# Patient Record
Sex: FEMALE | Race: WHITE | Employment: FULL TIME | URBAN - NONMETROPOLITAN AREA
[De-identification: names, ages, dates, MRNs, and addresses within clinical notes are randomized per-mention and may not be internally consistent; named-entity substitution may affect disease eponyms.]

---

## 2018-02-07 ENCOUNTER — HOSPITAL ENCOUNTER (OUTPATIENT)
Dept: WOMENS IMAGING | Age: 53
Discharge: HOME | End: 2018-02-09
Payer: COMMERCIAL

## 2018-02-07 DIAGNOSIS — Z12.39 BREAST SCREENING: ICD-10-CM

## 2018-02-07 PROCEDURE — 77067 SCR MAMMO BI INCL CAD: CPT

## 2018-08-15 DIAGNOSIS — N39.0 URINARY TRACT INFECTION WITHOUT HEMATURIA, SITE UNSPECIFIED: Primary | ICD-10-CM

## 2018-08-15 DIAGNOSIS — F32.A DEPRESSION, UNSPECIFIED DEPRESSION TYPE: Primary | ICD-10-CM

## 2018-08-15 RX ORDER — NITROFURANTOIN MACROCRYSTALS 50 MG/1
50 CAPSULE ORAL DAILY
Qty: 30 CAPSULE | Refills: 12 | Status: SHIPPED | OUTPATIENT
Start: 2018-08-15 | End: 2018-09-14

## 2018-10-31 ENCOUNTER — HOSPITAL ENCOUNTER (OUTPATIENT)
Age: 53
Discharge: HOME | End: 2018-10-31
Payer: COMMERCIAL

## 2018-10-31 ENCOUNTER — TELEPHONE (OUTPATIENT)
Dept: OBGYN | Age: 53
End: 2018-10-31

## 2018-10-31 DIAGNOSIS — R30.0 DYSURIA: Primary | ICD-10-CM

## 2018-10-31 DIAGNOSIS — R30.0 DYSURIA: ICD-10-CM

## 2018-10-31 LAB
BILIRUBIN URINE: NEGATIVE
COLOR: YELLOW
COMMENT UA: NORMAL
GLUCOSE URINE: NEGATIVE
KETONES, URINE: NEGATIVE
LEUKOCYTE ESTERASE, URINE: NEGATIVE
NITRITE, URINE: NEGATIVE
PH UA: 6 (ref 5–9)
PROTEIN UA: NEGATIVE
SPECIFIC GRAVITY UA: 1.01 (ref 1.01–1.02)
TURBIDITY: CLEAR
URINE HGB: NEGATIVE
UROBILINOGEN, URINE: NORMAL

## 2018-10-31 PROCEDURE — 81003 URINALYSIS AUTO W/O SCOPE: CPT

## 2018-10-31 PROCEDURE — 87086 URINE CULTURE/COLONY COUNT: CPT

## 2018-10-31 RX ORDER — NITROFURANTOIN 25; 75 MG/1; MG/1
100 CAPSULE ORAL 2 TIMES DAILY
Qty: 14 CAPSULE | Refills: 0 | Status: SHIPPED | OUTPATIENT
Start: 2018-10-31 | End: 2018-11-06

## 2018-11-01 LAB
CULTURE: NORMAL
Lab: NORMAL
SPECIMEN DESCRIPTION: NORMAL
STATUS: NORMAL

## 2018-11-06 ENCOUNTER — OFFICE VISIT (OUTPATIENT)
Dept: OBGYN | Age: 53
End: 2018-11-06
Payer: COMMERCIAL

## 2018-11-06 ENCOUNTER — HOSPITAL ENCOUNTER (OUTPATIENT)
Age: 53
Setting detail: SPECIMEN
Discharge: HOME | End: 2018-11-06
Payer: COMMERCIAL

## 2018-11-06 VITALS
WEIGHT: 166.8 LBS | BODY MASS INDEX: 32.75 KG/M2 | DIASTOLIC BLOOD PRESSURE: 80 MMHG | SYSTOLIC BLOOD PRESSURE: 138 MMHG | HEIGHT: 60 IN

## 2018-11-06 DIAGNOSIS — Z00.00 WELLNESS EXAMINATION: Primary | ICD-10-CM

## 2018-11-06 DIAGNOSIS — Z01.419 ENCOUNTER FOR ANNUAL ROUTINE GYNECOLOGICAL EXAMINATION: ICD-10-CM

## 2018-11-06 DIAGNOSIS — F43.23 ADJUSTMENT DISORDER WITH MIXED ANXIETY AND DEPRESSED MOOD: ICD-10-CM

## 2018-11-06 DIAGNOSIS — L91.8 SKIN TAG: ICD-10-CM

## 2018-11-06 DIAGNOSIS — N30.90 CYSTITIS: ICD-10-CM

## 2018-11-06 LAB — HGB, POC: 14.3

## 2018-11-06 PROCEDURE — 99396 PREV VISIT EST AGE 40-64: CPT | Performed by: ADVANCED PRACTICE MIDWIFE

## 2018-11-06 PROCEDURE — 88304 TISSUE EXAM BY PATHOLOGIST: CPT

## 2018-11-06 RX ORDER — NITROFURANTOIN MACROCRYSTALS 100 MG/1
100 CAPSULE ORAL 4 TIMES DAILY
COMMUNITY
End: 2020-02-13

## 2018-11-06 RX ORDER — MONTELUKAST SODIUM 10 MG/1
10 TABLET ORAL NIGHTLY
COMMUNITY

## 2018-11-06 RX ORDER — PROPRANOLOL HYDROCHLORIDE 20 MG/1
20 TABLET ORAL 3 TIMES DAILY
COMMUNITY

## 2018-11-06 RX ORDER — FUROSEMIDE 20 MG/1
20 TABLET ORAL 2 TIMES DAILY
COMMUNITY

## 2018-11-06 RX ORDER — LORATADINE 10 MG/1
10 TABLET ORAL DAILY
COMMUNITY

## 2018-11-06 ASSESSMENT — ENCOUNTER SYMPTOMS
SHORTNESS OF BREATH: 0
COUGH: 0
SINUS PAIN: 0
CONSTIPATION: 0
DIARRHEA: 0
DOUBLE VISION: 0
SORE THROAT: 0
WHEEZING: 0
VOMITING: 0
ABDOMINAL PAIN: 0

## 2018-11-06 ASSESSMENT — PATIENT HEALTH QUESTIONNAIRE - PHQ9
SUM OF ALL RESPONSES TO PHQ QUESTIONS 1-9: 0
SUM OF ALL RESPONSES TO PHQ QUESTIONS 1-9: 0
2. FEELING DOWN, DEPRESSED OR HOPELESS: 0
1. LITTLE INTEREST OR PLEASURE IN DOING THINGS: 0
SUM OF ALL RESPONSES TO PHQ9 QUESTIONS 1 & 2: 0

## 2018-11-07 RX ORDER — NITROFURANTOIN MACROCRYSTALS 50 MG/1
50 CAPSULE ORAL DAILY
Qty: 30 CAPSULE | Refills: 11 | Status: SHIPPED | OUTPATIENT
Start: 2018-11-07 | End: 2018-11-17

## 2018-11-08 LAB — DERMATOLOGY PATHOLOGY REPORT: NORMAL

## 2018-11-09 ENCOUNTER — TELEPHONE (OUTPATIENT)
Dept: OBGYN | Age: 53
End: 2018-11-09

## 2018-11-09 ENCOUNTER — OFFICE VISIT (OUTPATIENT)
Dept: OBGYN | Age: 53
End: 2018-11-09
Payer: COMMERCIAL

## 2018-11-09 ENCOUNTER — HOSPITAL ENCOUNTER (OUTPATIENT)
Age: 53
Setting detail: SPECIMEN
Discharge: HOME | End: 2018-11-09
Payer: COMMERCIAL

## 2018-11-09 VITALS
HEIGHT: 60 IN | SYSTOLIC BLOOD PRESSURE: 124 MMHG | DIASTOLIC BLOOD PRESSURE: 78 MMHG | WEIGHT: 165 LBS | BODY MASS INDEX: 32.39 KG/M2

## 2018-11-09 DIAGNOSIS — L08.9 WOUND INFECTION: ICD-10-CM

## 2018-11-09 DIAGNOSIS — L08.9 WOUND INFECTION: Primary | ICD-10-CM

## 2018-11-09 DIAGNOSIS — T14.8XXA WOUND INFECTION: Primary | ICD-10-CM

## 2018-11-09 DIAGNOSIS — T14.8XXA WOUND INFECTION: ICD-10-CM

## 2018-11-09 PROCEDURE — 99213 OFFICE O/P EST LOW 20 MIN: CPT | Performed by: ADVANCED PRACTICE MIDWIFE

## 2018-11-09 PROCEDURE — 87205 SMEAR GRAM STAIN: CPT

## 2018-11-09 PROCEDURE — 87070 CULTURE OTHR SPECIMN AEROBIC: CPT

## 2018-11-09 RX ORDER — CEPHALEXIN 500 MG/1
500 CAPSULE ORAL 3 TIMES DAILY
Qty: 21 CAPSULE | Refills: 0 | Status: SHIPPED | OUTPATIENT
Start: 2018-11-09 | End: 2020-02-13

## 2018-11-09 NOTE — PROGRESS NOTES
pressure 124/78, height 5' (1.524 m), weight 165 lb (74.8 kg), not currently breastfeeding. Labs:    No results found for this visit on 11/09/18. NURSE: Lianna MCALLISTER    HPI: here for f/u to left axillary skin tag removal, is concerned about infection because \"looks yellow\" denies pain, denies fevers     PT denies fever, chills, nausea and vomiting       Objective  Lymphatic:   no lymphadenopathy  Right axillary excision site, bandaid removed, no redness or drainage, likely yellowing is granulation tissue  Assessment and Plan          Diagnosis Orders   1. Wound infection  Wound Culture    cephALEXin (KEFLEX) 500 MG capsule     Will cover prophylacticALLY with antibiotics and await culture, care instructions give to leave open, ok to use neosporin        I am having Ms. Hutchinson start on cephALEXin. I am also having her maintain her sertraline, propranolol, furosemide, loratadine, montelukast, nitrofurantoin, sertraline, and nitrofurantoin. Return if symptoms worsen or fail to improve, for will call with results. There are no Patient Instructions on file for this visit. Over 50% of time spent on counseling and care coordination on: see assessment and plan,  She was also counseled on her preventative health maintenance recommendations and follow-up.         FF time: 15 min      Aron Yancey,11/10/2018 11:53 AM

## 2018-11-09 NOTE — LETTER
[facility]  [address] [place] [address]  Phone: [phone]  Fax: VEE Horan CNM        November 9, 2018     Patient: Rena Franklin   YOB: 1965   Date of Visit: 11/9/2018       To Whom it May Concern:    Kamra Baeza was seen in my clinic on 11/9/2018, please excuse her from work. If you have any questions or concerns, please don't hesitate to call.     Sincerely,         VEE Pavon CNM

## 2018-11-11 LAB
CULTURE: NO GROWTH
DIRECT EXAM: NORMAL
DIRECT EXAM: NORMAL
Lab: NORMAL
SPECIMEN DESCRIPTION: NORMAL
STATUS: NORMAL

## 2018-11-13 ENCOUNTER — TELEPHONE (OUTPATIENT)
Dept: OBGYN | Age: 53
End: 2018-11-13

## 2019-08-27 ENCOUNTER — TELEPHONE (OUTPATIENT)
Dept: OBGYN | Age: 54
End: 2019-08-27

## 2019-08-27 DIAGNOSIS — N39.0 RECURRENT UTI: Primary | ICD-10-CM

## 2019-08-27 RX ORDER — NITROFURANTOIN MACROCRYSTALS 50 MG/1
50 CAPSULE ORAL DAILY
Qty: 30 CAPSULE | Refills: 6 | Status: SHIPPED | OUTPATIENT
Start: 2019-08-27 | End: 2021-03-09 | Stop reason: SDUPTHER

## 2019-12-17 DIAGNOSIS — F32.A DEPRESSION, UNSPECIFIED DEPRESSION TYPE: ICD-10-CM

## 2020-02-13 ENCOUNTER — OFFICE VISIT (OUTPATIENT)
Dept: OBGYN | Age: 55
End: 2020-02-13
Payer: COMMERCIAL

## 2020-02-13 ENCOUNTER — HOSPITAL ENCOUNTER (OUTPATIENT)
Age: 55
Setting detail: SPECIMEN
Discharge: HOME | End: 2020-02-13
Payer: COMMERCIAL

## 2020-02-13 VITALS
SYSTOLIC BLOOD PRESSURE: 130 MMHG | BODY MASS INDEX: 32.98 KG/M2 | DIASTOLIC BLOOD PRESSURE: 74 MMHG | WEIGHT: 168 LBS | HEIGHT: 60 IN

## 2020-02-13 PROCEDURE — G0145 SCR C/V CYTO,THINLAYER,RESCR: HCPCS

## 2020-02-13 PROCEDURE — 99396 PREV VISIT EST AGE 40-64: CPT | Performed by: ADVANCED PRACTICE MIDWIFE

## 2020-02-13 RX ORDER — FLUTICASONE PROPIONATE 50 MCG
SPRAY, SUSPENSION (ML) NASAL
COMMUNITY
Start: 2020-02-03

## 2020-02-13 ASSESSMENT — ENCOUNTER SYMPTOMS
ABDOMINAL PAIN: 0
SHORTNESS OF BREATH: 0
BACK PAIN: 0

## 2020-02-13 ASSESSMENT — PATIENT HEALTH QUESTIONNAIRE - PHQ9
SUM OF ALL RESPONSES TO PHQ9 QUESTIONS 1 & 2: 0
2. FEELING DOWN, DEPRESSED OR HOPELESS: 0
SUM OF ALL RESPONSES TO PHQ QUESTIONS 1-9: 0
1. LITTLE INTEREST OR PLEASURE IN DOING THINGS: 0
SUM OF ALL RESPONSES TO PHQ QUESTIONS 1-9: 0

## 2020-02-13 NOTE — PROGRESS NOTES
YEARLY PHYSICAL    Date of service: 2020    Fredy Joens  Is a 49 y.o.   female    PT's PCP is: Boby Riley MD     : 1965                                             Subjective:       No LMP recorded. Patient has had a hysterectomy. Are your menses regular: not applicable    OB History    Para Term  AB Living   2 2 2     2   SAB TAB Ectopic Molar Multiple Live Births             2      # Outcome Date GA Lbr Leo/2nd Weight Sex Delivery Anes PTL Lv   2 Term 89 40w0d   M Vag-Spont   SAVANNA   1 Term 86 40w0d   F Vag-Spont   SAVANNA        Social History     Tobacco Use   Smoking Status Never Smoker   Smokeless Tobacco Never Used        Social History     Substance and Sexual Activity   Alcohol Use No       Family History   Problem Relation Age of Onset    Other Other         No family h/o ovarian or breast cancer. No family h/o DVT. Any family history of breast or ovarian cancer: No    Any family history of blood clots: No      Allergies: Patient has no known allergies.       Current Outpatient Medications:     fluticasone (FLONASE) 50 MCG/ACT nasal spray, , Disp: , Rfl:     nitrofurantoin (MACRODANTIN) 50 MG capsule, Take 1 capsule by mouth daily, Disp: 30 capsule, Rfl: 6    sertraline (ZOLOFT) 50 MG tablet, Take 1 tablet by mouth daily, Disp: 30 tablet, Rfl: 11    propranolol (INDERAL) 20 MG tablet, Take 20 mg by mouth 3 times daily, Disp: , Rfl:     furosemide (LASIX) 20 MG tablet, Take 20 mg by mouth 2 times daily, Disp: , Rfl:     loratadine (CLARITIN) 10 MG tablet, Take 10 mg by mouth daily, Disp: , Rfl:     montelukast (SINGULAIR) 10 MG tablet, Take 10 mg by mouth nightly, Disp: , Rfl:     Social History     Substance and Sexual Activity   Sexual Activity Yes    Partners: Male       Any bleeding or pain with intercourse: No    Last Yearly:  2019    Last pap:     Last HPV: Prolapse absent  Urethra: Fullness absent, Masses absent  Bladder:  Fullness absent, Masses absent, Tenderness absent, Cystocele absent  Vagina:  General appearance normal, Estrogen effect normal, Discharge absent, Lesions absent, Pelvic support normal  [place]: Masses absent, Tenderness absent  Anus/Perineum:  Lesions absent and Masses absent                                                            Assessment and Plan          Diagnosis Orders   1. Encounter for annual routine gynecological examination  PAP SMEAR   2. Screening mammogram, encounter for  [facility] DIGITAL SCREEN W OR WO CAD BILATERAL             I am having [facility] maintain her propranolol, furosemide, loratadine, montelukast, sertraline, nitrofurantoin, and fluticasone. Return in about 1 year (around 2/13/2021) for yearly. She was also counseled on her preventative health maintenance recommendations and follow-up. There are no Patient Instructions on file for this visit.     Maisha Yancey,2/13/2020 9:21 AM

## 2020-02-24 LAB — CYTOLOGY REPORT: NORMAL

## 2020-06-24 ENCOUNTER — HOSPITAL ENCOUNTER (OUTPATIENT)
Dept: WOMENS IMAGING | Age: 55
Discharge: HOME | End: 2020-06-26
Payer: COMMERCIAL

## 2020-06-24 PROCEDURE — 77063 BREAST TOMOSYNTHESIS BI: CPT

## 2020-09-03 RX ORDER — NITROFURANTOIN MACROCRYSTALS 50 MG/1
50 CAPSULE ORAL DAILY
Qty: 30 CAPSULE | Refills: 5 | Status: SHIPPED | OUTPATIENT
Start: 2020-09-03 | End: 2020-10-03

## 2021-03-09 DIAGNOSIS — N39.0 RECURRENT UTI: ICD-10-CM

## 2021-03-09 RX ORDER — NITROFURANTOIN MACROCRYSTALS 50 MG/1
50 CAPSULE ORAL DAILY
Qty: 30 CAPSULE | Refills: 6 | Status: SHIPPED | OUTPATIENT
Start: 2021-03-09 | End: 2021-10-25

## 2021-03-09 NOTE — TELEPHONE ENCOUNTER
Patient had appointment on 2/16/21 Level three day. Rescheduled for 6/15/21. Needs refill of Macrodantin sent to [facility].

## 2021-04-26 DIAGNOSIS — F43.23 ADJUSTMENT DISORDER WITH MIXED ANXIETY AND DEPRESSED MOOD: ICD-10-CM

## 2021-06-15 ENCOUNTER — OFFICE VISIT (OUTPATIENT)
Dept: OBGYN | Age: 56
End: 2021-06-15
Payer: COMMERCIAL

## 2021-06-15 VITALS
SYSTOLIC BLOOD PRESSURE: 130 MMHG | BODY MASS INDEX: 32 KG/M2 | DIASTOLIC BLOOD PRESSURE: 74 MMHG | WEIGHT: 163 LBS | HEIGHT: 60 IN

## 2021-06-15 DIAGNOSIS — Z01.419 ENCOUNTER FOR ANNUAL ROUTINE GYNECOLOGICAL EXAMINATION: Primary | ICD-10-CM

## 2021-06-15 DIAGNOSIS — F43.23 ADJUSTMENT DISORDER WITH MIXED ANXIETY AND DEPRESSED MOOD: ICD-10-CM

## 2021-06-15 DIAGNOSIS — Z12.31 SCREENING MAMMOGRAM, ENCOUNTER FOR: ICD-10-CM

## 2021-06-15 DIAGNOSIS — Z12.11 SCREEN FOR COLON CANCER: ICD-10-CM

## 2021-06-15 PROCEDURE — 99396 PREV VISIT EST AGE 40-64: CPT | Performed by: ADVANCED PRACTICE MIDWIFE

## 2021-06-15 RX ORDER — DICYCLOMINE HYDROCHLORIDE 10 MG/1
CAPSULE ORAL
COMMUNITY
Start: 2021-05-19

## 2021-06-15 RX ORDER — ALBUTEROL SULFATE 90 UG/1
AEROSOL, METERED RESPIRATORY (INHALATION)
COMMUNITY
Start: 2021-05-19

## 2021-06-15 ASSESSMENT — PATIENT HEALTH QUESTIONNAIRE - PHQ9
1. LITTLE INTEREST OR PLEASURE IN DOING THINGS: 0
SUM OF ALL RESPONSES TO PHQ QUESTIONS 1-9: 0
SUM OF ALL RESPONSES TO PHQ9 QUESTIONS 1 & 2: 0
2. FEELING DOWN, DEPRESSED OR HOPELESS: 0

## 2021-06-15 ASSESSMENT — ENCOUNTER SYMPTOMS
BACK PAIN: 0
SHORTNESS OF BREATH: 0
ABDOMINAL PAIN: 0

## 2021-06-15 NOTE — PROGRESS NOTES
YEARLY PHYSICAL    Date of service: 6/15/2021    Chester Kumar  Is a 48 y.o.   female    PT's PCP is: Lara Clarke MD     : 1965                                             Subjective:       No LMP recorded. Patient has had a hysterectomy. Are your menses regular: not applicable    OB History    Para Term  AB Living   2 2 2     2   SAB TAB Ectopic Molar Multiple Live Births             2      # Outcome Date GA Lbr Leo/2nd Weight Sex Delivery Anes PTL Lv   2 Term 89 40w0d   M Vag-Spont   SAVANNA   1 Term 86 40w0d   F Vag-Spont   SAVANNA        Social History     Tobacco Use   Smoking Status Never Smoker   Smokeless Tobacco Never Used        Social History     Substance and Sexual Activity   Alcohol Use No       Family History   Problem Relation Age of Onset    Other Other         No family h/o ovarian or breast cancer. No family h/o DVT. Any family history of breast or ovarian cancer: No    Any family history of blood clots: No    Have you had a positive covid test: No    Have you had the covid immunization: Yes      Allergies: Patient has no known allergies.       Current Outpatient Medications:     albuterol sulfate  (90 Base) MCG/ACT inhaler, inhale 2 puffs by mouth and INTO THE LUNGS every 4 hours if needed, Disp: , Rfl:     dicyclomine (BENTYL) 10 MG capsule, , Disp: , Rfl:     NONFORMULARY, Indications: Control , Disp: , Rfl:     nitrofurantoin (MACRODANTIN) 50 MG capsule, Take 1 capsule by mouth daily, Disp: 30 capsule, Rfl: 6    fluticasone (FLONASE) 50 MCG/ACT nasal spray, , Disp: , Rfl:     propranolol (INDERAL) 20 MG tablet, Take 20 mg by mouth 3 times daily, Disp: , Rfl:     furosemide (LASIX) 20 MG tablet, Take 20 mg by mouth 2 times daily, Disp: , Rfl:     loratadine (CLARITIN) 10 MG tablet, Take 10 mg by mouth daily, Disp: , Rfl:     montelukast (SINGULAIR) 10 MG tablet, Take 10 mg by mouth nightly, Disp: , Rfl:     sertraline (ZOLOFT) 50 MG tablet, Take 1 tablet by mouth once daily, Disp: 90 tablet, Rfl: 3    Social History     Substance and Sexual Activity   Sexual Activity Yes    Partners: Male       Any bleeding or pain with intercourse: No    Last Yearly:  2020    Last pap: 2020    Last HPV: unknown    Last Mammogram: 06/24/2020    Last Delsie Stammer never    Last colorectal screen- type:colonoscopy*  date  2000    Do you do self breast exams: Yes    Past Medical History:   Diagnosis Date    Hypertension        Past Surgical History:   Procedure Laterality Date    APPENDECTOMY      CHOLECYSTECTOMY      HYSTERECTOMY, VAGINAL      KNEE SURGERY      x 4    OVARY REMOVAL      SHOULDER SURGERY Left        Family History   Problem Relation Age of Onset    Other Other         No family h/o ovarian or breast cancer. No family h/o DVT. Chief Complaint   Patient presents with    Gynecologic Exam     Yearly exam. Last pap 02/13/2020 negative. PE:  Vital Signs  Blood pressure 130/74, height 5' (1.524 m), weight 163 lb (73.9 kg), not currently breastfeeding. Estimated body mass index is 31.83 kg/m² as calculated from the following:    Height as of this encounter: 5' (1.524 m). Weight as of this encounter: 163 lb (73.9 kg). Labs:    No results found for this visit on 06/15/21. PHQ-9 Total Score: 0 (6/15/2021  9:11 AM)      NURSE: Lianna MCALLISTER    HPI: here for annual gyn exam, has started using OTC urinary health product, desires to d/c daily macrodantin prophylactic dosing    Review of Systems   Constitutional: Negative. HENT: Negative for congestion. Respiratory: Negative for shortness of breath. Cardiovascular: Negative for chest pain. Gastrointestinal: Negative for abdominal pain. Genitourinary: Negative for dysuria. Musculoskeletal: Negative for back pain. Skin: Negative for rash. Neurological: Negative for headaches. Psychiatric/Behavioral: The patient is not nervous/anxious. Objective  Lymphatic:   no lymphadenopathy  Heent:   negative   Cor: regular rate and rhythm, no murmurs              Pul:clear to auscultation bilaterally- no wheezes, rales or rhonchi, normal air movement, no respiratory distress      GI: Abdomen soft, non-tender. BS normal. No masses,  No organomegaly           Extremities: normal strength, tone, and muscle mass   Breasts: Breast:normal appearance, no masses or tenderness   Pelvic Exam: GENITAL/URINARY:  External Genitalia:  General appearance; normal, Hair distribution; normal, Lesions absent  Urethral Meatus:  Size normal, Location normal, Lesions absent, Prolapse absent  Urethra: Fullness absent, Masses absent  Bladder:  Fullness absent, Masses absent, Tenderness absent, Cystocele absent  Vagina:  General appearance normal, Estrogen effect normal, Discharge absent, Lesions absent, Pelvic support normal  [place]: Masses absent, Tenderness absent  Anus/Perineum:  Lesions absent and Masses absent                                                              Assessment and Plan          Diagnosis Orders   1. Encounter for annual routine gynecological examination     2. Screening mammogram, encounter for  [facility] SHAI DIGITAL SCREEN BILATERAL   3. Screen for colon cancer  Cologuard (For External Results Only)       is going to stop prophy macrodantin and continue OTC product      I am having Janoctavio Joness maintain her propranolol, furosemide, loratadine, montelukast, fluticasone, nitrofurantoin, albuterol sulfate HFA, dicyclomine, and NONFORMULARY. Return in about 1 year (around 6/15/2022) for yearly. She was also counseled on her preventative health maintenance recommendations and follow-up. There are no Patient Instructions on file for this visit.     VEE Gonzales CNM,6/15/2021 10:47 PM

## 2021-06-28 DIAGNOSIS — Z12.11 SCREEN FOR COLON CANCER: ICD-10-CM

## 2021-07-07 ENCOUNTER — HOSPITAL ENCOUNTER (OUTPATIENT)
Dept: WOMENS IMAGING | Age: 56
Discharge: HOME | End: 2021-07-09
Payer: COMMERCIAL

## 2021-07-07 DIAGNOSIS — Z12.31 SCREENING MAMMOGRAM, ENCOUNTER FOR: ICD-10-CM

## 2021-07-07 PROCEDURE — 77063 BREAST TOMOSYNTHESIS BI: CPT

## 2021-09-22 ENCOUNTER — TELEPHONE (OUTPATIENT)
Dept: OBGYN | Age: 56
End: 2021-09-22

## 2021-09-22 NOTE — TELEPHONE ENCOUNTER
Patient calls and complains of vulvar irritation for the last 4 days. She states it is painful. Tried desitin not helping. Can anything be given?

## 2021-10-19 ENCOUNTER — TELEPHONE (OUTPATIENT)
Dept: OBGYN | Age: 56
End: 2021-10-19

## 2021-10-19 NOTE — TELEPHONE ENCOUNTER
Patient advised that Cologard was not acceptable and will need re collected. Patient states she will contact company and re collect.

## 2021-10-25 DIAGNOSIS — N39.0 RECURRENT UTI: ICD-10-CM

## 2021-10-25 RX ORDER — NITROFURANTOIN MACROCRYSTALS 50 MG/1
CAPSULE ORAL
Qty: 90 CAPSULE | Refills: 1 | Status: SHIPPED | OUTPATIENT
Start: 2021-10-25

## 2022-03-03 ENCOUNTER — TELEPHONE (OUTPATIENT)
Dept: OBGYN | Age: 57
End: 2022-03-03

## 2022-03-03 RX ORDER — FLUCONAZOLE 150 MG/1
150 TABLET ORAL ONCE
Qty: 1 TABLET | Refills: 0 | Status: SHIPPED | OUTPATIENT
Start: 2022-03-03 | End: 2022-03-03

## 2022-03-03 NOTE — TELEPHONE ENCOUNTER
C/O vaginal redness, sore and vaginal swelling. No discharge however very uncomfortable. Patient has appointment next week however does not feel she can wait until then. Yasmeen Barrett

## 2022-03-03 NOTE — TELEPHONE ENCOUNTER
We can send in a diflucan tablet 150 mg to hold her over, I think she already has lotrisone but that may help too.   Otherwise, she definitely needs seen

## 2022-03-09 ENCOUNTER — OFFICE VISIT (OUTPATIENT)
Dept: OBGYN | Age: 57
End: 2022-03-09
Payer: COMMERCIAL

## 2022-03-09 ENCOUNTER — HOSPITAL ENCOUNTER (OUTPATIENT)
Age: 57
Setting detail: SPECIMEN
Discharge: HOME | End: 2022-03-09
Payer: COMMERCIAL

## 2022-03-09 VITALS
DIASTOLIC BLOOD PRESSURE: 74 MMHG | WEIGHT: 167 LBS | SYSTOLIC BLOOD PRESSURE: 126 MMHG | BODY MASS INDEX: 32.79 KG/M2 | HEIGHT: 60 IN

## 2022-03-09 DIAGNOSIS — F43.23 ADJUSTMENT DISORDER WITH MIXED ANXIETY AND DEPRESSED MOOD: ICD-10-CM

## 2022-03-09 DIAGNOSIS — N76.0 VULVOVAGINITIS: ICD-10-CM

## 2022-03-09 DIAGNOSIS — N39.0 RECURRENT UTI: ICD-10-CM

## 2022-03-09 DIAGNOSIS — N76.0 VULVOVAGINITIS: Primary | ICD-10-CM

## 2022-03-09 DIAGNOSIS — Z12.31 SCREENING MAMMOGRAM, ENCOUNTER FOR: ICD-10-CM

## 2022-03-09 LAB
CANDIDA SPECIES, DNA PROBE: NEGATIVE
GARDNERELLA VAGINALIS, DNA PROBE: NEGATIVE
SOURCE: NORMAL
TRICHOMONAS VAGINALIS DNA: NEGATIVE

## 2022-03-09 PROCEDURE — 87660 TRICHOMONAS VAGIN DIR PROBE: CPT

## 2022-03-09 PROCEDURE — 87480 CANDIDA DNA DIR PROBE: CPT

## 2022-03-09 PROCEDURE — 87510 GARDNER VAG DNA DIR PROBE: CPT

## 2022-03-09 PROCEDURE — 87070 CULTURE OTHR SPECIMN AEROBIC: CPT

## 2022-03-09 PROCEDURE — 99214 OFFICE O/P EST MOD 30 MIN: CPT | Performed by: ADVANCED PRACTICE MIDWIFE

## 2022-03-09 RX ORDER — CLOBETASOL PROPIONATE 0.5 MG/G
OINTMENT TOPICAL
Qty: 45 G | Refills: 2 | Status: SHIPPED | OUTPATIENT
Start: 2022-03-09

## 2022-03-09 RX ORDER — OXYBUTYNIN CHLORIDE 5 MG/1
5 TABLET, EXTENDED RELEASE ORAL DAILY
COMMUNITY
Start: 2022-02-08

## 2022-03-09 RX ORDER — ESTRADIOL 10 UG/1
10 INSERT VAGINAL
Qty: 8 TABLET | Refills: 11 | Status: SHIPPED | OUTPATIENT
Start: 2022-03-10

## 2022-03-09 RX ORDER — NORTRIPTYLINE HYDROCHLORIDE 50 MG/1
50 CAPSULE ORAL NIGHTLY
COMMUNITY

## 2022-03-09 RX ORDER — METHENAMINE, SODIUM PHOSPHATE, MONOBASIC, MONOHYDRATE, PHENYL SALICYLATE, METHYLENE BLUE, AND HYOSCYAMINE SULFATE 120; 40.8; 36; 10; .12 MG/1; MG/1; MG/1; MG/1; MG/1
1 CAPSULE ORAL 2 TIMES DAILY
COMMUNITY
Start: 2022-02-08 | End: 2022-03-10

## 2022-03-09 RX ORDER — NITROFURANTOIN MACROCRYSTALS 50 MG/1
50 CAPSULE ORAL DAILY
Qty: 90 CAPSULE | Refills: 3 | Status: SHIPPED | OUTPATIENT
Start: 2022-03-09 | End: 2022-04-26 | Stop reason: SDUPTHER

## 2022-03-09 NOTE — PROGRESS NOTES
PROBLEM VISIT     Date of service: 3/9/2022    Fritz Montgomery  Is a 55 y.o.  female    PT's PCP is: Lovella Apley, MD     : 1965                                             Subjective:       No LMP recorded. Patient has had a hysterectomy.    OB History    Para Term  AB Living   2 2 2     2   SAB IAB Ectopic Molar Multiple Live Births             2      # Outcome Date GA Lbr Leo/2nd Weight Sex Delivery Anes PTL Lv   2 Term 89 40w0d   M Vag-Spont   SAVANNA   1 Term 86 40w0d   F Vag-Spont   SAVANNA        Social History     Tobacco Use   Smoking Status Never Smoker   Smokeless Tobacco Never Used        Social History     Substance and Sexual Activity   Alcohol Use No       Allergies: Latex      Current Outpatient Medications:     fluticasone-salmeterol (ADVAIR) 250-50 MCG/DOSE AEPB, Inhale 1 puff into the lungs 2 times daily, Disp: , Rfl:     Meth-Hyo-M Bl-Na Phos-Ph Sal (URIBEL) 118 MG CAPS, Take 1 capsule by mouth 2 times daily, Disp: , Rfl:     oxybutynin (DITROPAN-XL) 5 MG extended release tablet, Take 5 mg by mouth daily, Disp: , Rfl:     nortriptyline (PAMELOR) 50 MG capsule, Take 50 mg by mouth nightly, Disp: , Rfl:     nitrofurantoin (MACRODANTIN) 50 MG capsule, Take 1 capsule by mouth once daily, Disp: 90 capsule, Rfl: 1    albuterol sulfate  (90 Base) MCG/ACT inhaler, inhale 2 puffs by mouth and INTO THE LUNGS every 4 hours if needed, Disp: , Rfl:     dicyclomine (BENTYL) 10 MG capsule, , Disp: , Rfl:     sertraline (ZOLOFT) 50 MG tablet, Take 1 tablet by mouth once daily, Disp: 90 tablet, Rfl: 3    fluticasone (FLONASE) 50 MCG/ACT nasal spray, , Disp: , Rfl:     propranolol (INDERAL) 20 MG tablet, Take 20 mg by mouth 3 times daily, Disp: , Rfl:     furosemide (LASIX) 20 MG tablet, Take 20 mg by mouth 2 times daily, Disp: , Rfl:     loratadine (CLARITIN) 10 MG tablet, Take 10 mg by mouth daily, Disp: , Rfl:     montelukast (SINGULAIR) 10 MG tablet, Take 10 mg by mouth nightly, Disp: , Rfl:     NONFORMULARY, Indications: Control  (Patient not taking: No sig reported), Disp: , Rfl:     Social History     Substance and Sexual Activity   Sexual Activity Not Currently    Partners: Male       Last Yearly:  02/13/2020    Last pap: 02/13/2020    Last HPV: unknown    Have you had a positive covid test: No    Have you had the covid immunization: Yes    Chief Complaint   Patient presents with    Vaginal Pain     C/O red warm vaginal pain for approx. 1 week. Patient was given Diflucan and feels somewhat better. Patient has had frequent UTI nd will be seeing urologist in [place] next week. PE:  Vital Signs  Blood pressure 126/74, height 5' (1.524 m), weight 167 lb (75.8 kg), not currently breastfeeding. Estimated body mass index is 32.61 kg/m² as calculated from the following:    Height as of this encounter: 5' (1.524 m). Weight as of this encounter: 167 lb (75.8 kg). No data recorded    NURSE: Lianna MCALLISTER    HPI: here for c/o vulvovaginitis,  but requests annual refills on her zoloft for her anxiety and depression as it is working well - and macrobid to prophylax UTIs (reports her urologists are aware of this and approve of continuation)     PT denies fever, chills, nausea and vomiting       Objective   No acute distress  Excellent communications  Well-nourished       Pelvic Exam: GENITAL/URINARY:  External Genitalia:  General appearance, slight errythema external, Hair distribution; normal, Lesions absent  Urethral Meatus:  Size normal, Location normal, Lesions absent, Prolapse absent  Urethra: Fullness absent, Masses absent  Bladder:  Fullness absent, Masses absent, Tenderness absent, Cystocele absent  Vagina:  General appearance atophic, , Discharge absent, Lesions absent, Pelvic support normal  [place]:   Masses absent, Tenderness absent  Anus/Perineum:  Lesions absent and Masses absent                                                        Results reviewed today:    No results found for this visit on 03/09/22. Assessment and Plan          Diagnosis Orders   1. Vulvovaginitis  Estradiol (VAGIFEM) 10 MCG TABS vaginal tablet    clobetasol (TEMOVATE) 0.05 % ointment   2. Recurrent UTI  nitrofurantoin (MACRODANTIN) 50 MG capsule   3. Screening mammogram, encounter for  [facility] SHAI DIGITAL SCREEN BILATERAL   4. Adjustment disorder with mixed anxiety and depressed mood  sertraline (ZOLOFT) 50 MG tablet       will reculture, treat for atrophy with estrogen and topically with steroids for symptom relief      I am having Bethany Hutchinson start on Estradiol, clobetasol, nitrofurantoin, and sertraline. I am also having her maintain her propranolol, furosemide, loratadine, montelukast, fluticasone, albuterol sulfate HFA, dicyclomine, NONFORMULARY, sertraline, nitrofurantoin, fluticasone-salmeterol, uribel, oxybutynin, and nortriptyline. Return in about 1 year (around 3/9/2023) for yearly. There are no Patient Instructions on file for this visit. Over 50% of time spent on counseling and care coordination on: see assessment and plan,  She was also counseled on her preventative health maintenance recommendations and follow-up.         FF time: 30 min      VEE Garcia CNM,3/9/2022 9:14 AM

## 2022-03-12 LAB
CULTURE: NORMAL
SPECIMEN DESCRIPTION: NORMAL

## 2022-03-14 DIAGNOSIS — B37.31 YEAST VAGINITIS: Primary | ICD-10-CM

## 2022-03-14 RX ORDER — FLUCONAZOLE 150 MG/1
150 TABLET ORAL ONCE
Qty: 1 TABLET | Refills: 0 | Status: SHIPPED | OUTPATIENT
Start: 2022-03-14 | End: 2022-03-14

## 2022-03-23 ENCOUNTER — TELEPHONE (OUTPATIENT)
Dept: OBGYN | Age: 57
End: 2022-03-23

## 2022-03-23 NOTE — TELEPHONE ENCOUNTER
Patient called and left message with concerns. Patient was recently seen and treated with Vagifem and clobetasol. Patient feels she is not better. Still has vaginal irritation and redness no discharge . Advice?

## 2022-03-29 ENCOUNTER — TELEPHONE (OUTPATIENT)
Dept: OBGYN | Age: 57
End: 2022-03-29

## 2022-03-29 NOTE — TELEPHONE ENCOUNTER
Patient calls with concerns, was seen 03/09/2022 and was treated with Vagifem, Diflucan  and Clobetasol. Patient did not improve and was advised to stop the  Vagifem. Patient complains she still has no improvement. Feels very irritated  , warm and moist. Advice ? Appointment ?

## 2022-03-30 ENCOUNTER — HOSPITAL ENCOUNTER (OUTPATIENT)
Age: 57
Setting detail: SPECIMEN
Discharge: HOME | End: 2022-03-30
Payer: COMMERCIAL

## 2022-03-30 ENCOUNTER — PROCEDURE VISIT (OUTPATIENT)
Dept: OBGYN | Age: 57
End: 2022-03-30
Payer: COMMERCIAL

## 2022-03-30 VITALS
WEIGHT: 168.2 LBS | BODY MASS INDEX: 33.02 KG/M2 | HEIGHT: 60 IN | DIASTOLIC BLOOD PRESSURE: 78 MMHG | SYSTOLIC BLOOD PRESSURE: 130 MMHG

## 2022-03-30 DIAGNOSIS — N76.3 CHRONIC VULVITIS: ICD-10-CM

## 2022-03-30 DIAGNOSIS — N76.3 CHRONIC VULVITIS: Primary | ICD-10-CM

## 2022-03-30 PROCEDURE — 56605 BIOPSY OF VULVA/PERINEUM: CPT | Performed by: ADVANCED PRACTICE MIDWIFE

## 2022-03-30 PROCEDURE — 88305 TISSUE EXAM BY PATHOLOGIST: CPT

## 2022-03-30 NOTE — PROGRESS NOTES
PROBLEM VISIT     Date of service: 3/30/2022    Anu Stafford  Is a 55 y.o.  female    PT's PCP is: Anuradha Peck MD     : 1965                                             Subjective:       No LMP recorded. Patient has had a hysterectomy.    OB History    Para Term  AB Living   2 2 2     2   SAB IAB Ectopic Molar Multiple Live Births             2      # Outcome Date GA Lbr Leo/2nd Weight Sex Delivery Anes PTL Lv   2 Term 89 40w0d   M Vag-Spont   SAVANNA   1 Term 86 40w0d   F Vag-Spont   SAVANNA        Social History     Tobacco Use   Smoking Status Never Smoker   Smokeless Tobacco Never Used        Social History     Substance and Sexual Activity   Alcohol Use No       Allergies: Latex      Current Outpatient Medications:     fluticasone-salmeterol (ADVAIR) 250-50 MCG/DOSE AEPB, Inhale 1 puff into the lungs 2 times daily, Disp: , Rfl:     oxybutynin (DITROPAN-XL) 5 MG extended release tablet, Take 5 mg by mouth daily, Disp: , Rfl:     nortriptyline (PAMELOR) 50 MG capsule, Take 50 mg by mouth nightly, Disp: , Rfl:     Estradiol (VAGIFEM) 10 MCG TABS vaginal tablet, Place 1 tablet vaginally Twice a Week, Disp: 8 tablet, Rfl: 11    clobetasol (TEMOVATE) 0.05 % ointment, Apply topically 2 times daily as needed, Disp: 45 g, Rfl: 2    nitrofurantoin (MACRODANTIN) 50 MG capsule, Take 1 capsule by mouth daily, Disp: 90 capsule, Rfl: 3    sertraline (ZOLOFT) 50 MG tablet, Take 1 tablet by mouth daily, Disp: 90 tablet, Rfl: 3    nitrofurantoin (MACRODANTIN) 50 MG capsule, Take 1 capsule by mouth once daily, Disp: 90 capsule, Rfl: 1    albuterol sulfate  (90 Base) MCG/ACT inhaler, inhale 2 puffs by mouth and INTO THE LUNGS every 4 hours if needed, Disp: , Rfl:     dicyclomine (BENTYL) 10 MG capsule, , Disp: , Rfl:     sertraline (ZOLOFT) 50 MG tablet, Take 1 tablet by mouth once daily, Disp: 90 tablet, Rfl: 3    fluticasone (FLONASE) 50 MCG/ACT nasal spray, , Disp: , Rfl:     propranolol (INDERAL) 20 MG tablet, Take 20 mg by mouth 3 times daily, Disp: , Rfl:     furosemide (LASIX) 20 MG tablet, Take 20 mg by mouth 2 times daily, Disp: , Rfl:     loratadine (CLARITIN) 10 MG tablet, Take 10 mg by mouth daily, Disp: , Rfl:     montelukast (SINGULAIR) 10 MG tablet, Take 10 mg by mouth nightly, Disp: , Rfl:     NONFORMULARY, Indications: Control  (Patient not taking: No sig reported), Disp: , Rfl:     Social History     Substance and Sexual Activity   Sexual Activity Not Currently    Partners: Male       Last Yearly:  02/13/2020    Last pap: 02/13/2020    Last HPV: unknown    Have you had a positive covid test: No    Have you had the covid immunization: Yes    Chief Complaint   Patient presents with    Vaginitis     Patient c/o chronic vaginal irritation. PE:  Vital Signs  Blood pressure 130/78, height 5' (1.524 m), weight 168 lb 3.2 oz (76.3 kg), not currently breastfeeding. Estimated body mass index is 32.85 kg/m² as calculated from the following:    Height as of this encounter: 5' (1.524 m). Weight as of this encounter: 168 lb 3.2 oz (76.3 kg). No data recorded    NURSE: Wendy MCALLISTER    HPI: has had persistent vulvar irritation, has had tx for yeast, steroids and topical estrogen     PT denies fever, chills, nausea and vomiting       Objective   No acute distress  Excellent communications  Well-nourished       Pelvic Exam: GENITAL/URINARY:  External Genitalia:  Hair distribution; normal, Lesions absent, l. Minora and introitus blanched with characteristics of atrophy and possibly lichen    Physical Exam  Genitourinary:        sites x2 on l.  Minora at 11 and 1 oclock, cleaned with betadine, injected with 1% lidocaine wheel, biopsy forceps used to take biopsies on both sites, AgNO3 for hemostasis, triple antibiotic ointment covered with gauze                                                             Results reviewed today:    No results found for this visit on 03/30/22. Assessment and Plan          Diagnosis Orders   1. Chronic vulvitis  TN BIOPSY VULVA/PERINEUM,ONE LESN    Surgical Pathology    Surgical Pathology       consider gynazole (see gen culture) diependent on results      I am having Laura Burgos maintain her propranolol, furosemide, loratadine, montelukast, fluticasone, albuterol sulfate HFA, dicyclomine, NONFORMULARY, sertraline, nitrofurantoin, fluticasone-salmeterol, oxybutynin, nortriptyline, Estradiol, clobetasol, nitrofurantoin, and sertraline. No follow-ups on file. There are no Patient Instructions on file for this visit. Over 50% of time spent on counseling and care coordination on: see assessment and plan,  She was also counseled on her preventative health maintenance recommendations and follow-up.         FF time: 30 min      VEE Hernandez CNM,3/30/2022 2:43 PM

## 2022-04-01 LAB — SURGICAL PATHOLOGY REPORT: NORMAL

## 2022-04-06 ENCOUNTER — HOSPITAL ENCOUNTER (OUTPATIENT)
Age: 57
Setting detail: SPECIMEN
Discharge: HOME | End: 2022-04-06
Payer: COMMERCIAL

## 2022-04-06 ENCOUNTER — OFFICE VISIT (OUTPATIENT)
Dept: OBGYN | Age: 57
End: 2022-04-06
Payer: COMMERCIAL

## 2022-04-06 VITALS
BODY MASS INDEX: 32.98 KG/M2 | WEIGHT: 168 LBS | DIASTOLIC BLOOD PRESSURE: 82 MMHG | HEIGHT: 60 IN | SYSTOLIC BLOOD PRESSURE: 122 MMHG

## 2022-04-06 DIAGNOSIS — N90.89 VULVAR IRRITATION: ICD-10-CM

## 2022-04-06 DIAGNOSIS — N90.89 VULVAR IRRITATION: Primary | ICD-10-CM

## 2022-04-06 PROCEDURE — 87070 CULTURE OTHR SPECIMN AEROBIC: CPT

## 2022-04-06 PROCEDURE — 99213 OFFICE O/P EST LOW 20 MIN: CPT | Performed by: ADVANCED PRACTICE MIDWIFE

## 2022-04-06 RX ORDER — SULFAMETHOXAZOLE AND TRIMETHOPRIM 800; 160 MG/1; MG/1
1 TABLET ORAL 2 TIMES DAILY
Qty: 14 TABLET | Refills: 0 | Status: SHIPPED | OUTPATIENT
Start: 2022-04-06 | End: 2022-04-13

## 2022-04-06 RX ORDER — LIDOCAINE 50 MG/G
OINTMENT TOPICAL
Qty: 30 G | Refills: 1 | Status: SHIPPED | OUTPATIENT
Start: 2022-04-06

## 2022-04-06 RX ORDER — FLUCONAZOLE 150 MG/1
150 TABLET ORAL ONCE
Qty: 1 TABLET | Refills: 0 | Status: SHIPPED | OUTPATIENT
Start: 2022-04-06 | End: 2022-04-06

## 2022-04-06 NOTE — PROGRESS NOTES
PROBLEM VISIT     Date of service: 2022    Marty Terrazas  Is a 55 y.o.  female    PT's PCP is: Brady Virk MD     : 1965                                             Subjective:       No LMP recorded. Patient has had a hysterectomy. OB History    Para Term  AB Living   2 2 2     2   SAB IAB Ectopic Molar Multiple Live Births             2      # Outcome Date GA Lbr Leo/2nd Weight Sex Delivery Anes PTL Lv   2 Term 89 40w0d   M Vag-Spont   SAVANNA   1 Term 86 40w0d   F Vag-Spont   SAVANNA        Social History     Tobacco Use   Smoking Status Never Smoker   Smokeless Tobacco Never Used        Social History     Substance and Sexual Activity   Alcohol Use No       Allergies: Latex      Current Outpatient Medications:     sulfamethoxazole-trimethoprim (BACTRIM DS;SEPTRA DS) 800-160 MG per tablet, Take 1 tablet by mouth 2 times daily for 7 days, Disp: 14 tablet, Rfl: 0    fluconazole (DIFLUCAN) 150 MG tablet, Take 1 tablet by mouth once for 1 dose, Disp: 1 tablet, Rfl: 0    lidocaine (XYLOCAINE) 5 % ointment, Apply topically as needed.  Every 2 hours, Disp: 30 g, Rfl: 1    fluticasone-salmeterol (ADVAIR) 250-50 MCG/DOSE AEPB, Inhale 1 puff into the lungs 2 times daily, Disp: , Rfl:     oxybutynin (DITROPAN-XL) 5 MG extended release tablet, Take 5 mg by mouth daily, Disp: , Rfl:     nortriptyline (PAMELOR) 50 MG capsule, Take 50 mg by mouth nightly, Disp: , Rfl:     Estradiol (VAGIFEM) 10 MCG TABS vaginal tablet, Place 1 tablet vaginally Twice a Week, Disp: 8 tablet, Rfl: 11    clobetasol (TEMOVATE) 0.05 % ointment, Apply topically 2 times daily as needed, Disp: 45 g, Rfl: 2    nitrofurantoin (MACRODANTIN) 50 MG capsule, Take 1 capsule by mouth daily, Disp: 90 capsule, Rfl: 3    sertraline (ZOLOFT) 50 MG tablet, Take 1 tablet by mouth daily, Disp: 90 tablet, Rfl: 3    nitrofurantoin (MACRODANTIN) 50 MG capsule, Take 1 capsule by mouth once daily, Disp: 90 capsule, Rfl: 1    albuterol sulfate  (90 Base) MCG/ACT inhaler, inhale 2 puffs by mouth and INTO THE LUNGS every 4 hours if needed, Disp: , Rfl:     dicyclomine (BENTYL) 10 MG capsule, , Disp: , Rfl:     fluticasone (FLONASE) 50 MCG/ACT nasal spray, , Disp: , Rfl:     propranolol (INDERAL) 20 MG tablet, Take 20 mg by mouth 3 times daily, Disp: , Rfl:     furosemide (LASIX) 20 MG tablet, Take 20 mg by mouth 2 times daily, Disp: , Rfl:     loratadine (CLARITIN) 10 MG tablet, Take 10 mg by mouth daily, Disp: , Rfl:     montelukast (SINGULAIR) 10 MG tablet, Take 10 mg by mouth nightly, Disp: , Rfl:     NONFORMULARY, Indications: Control  (Patient not taking: No sig reported), Disp: , Rfl:     sertraline (ZOLOFT) 50 MG tablet, Take 1 tablet by mouth once daily, Disp: 90 tablet, Rfl: 3    Social History     Substance and Sexual Activity   Sexual Activity Not Currently    Partners: Male       Last Yearly:  2/13/20    Last pap: 2/13/20    Last HPV: never    Have you had a positive covid test: No    Have you had the covid immunization: Yes    Chief Complaint   Patient presents with    Vaginal Itching     patient presents today for vaginal irritation f/u, vulver bx on 3/30/22. states area is red and very sore/irritated. PE:  Vital Signs  Blood pressure 122/82, height 5' (1.524 m), weight 168 lb (76.2 kg), not currently breastfeeding. Estimated body mass index is 32.81 kg/m² as calculated from the following:    Height as of this encounter: 5' (1.524 m). Weight as of this encounter: 168 lb (76.2 kg).     No data recorded    NURSE: Diana Doshi    HPI: patient had vulvar biopsies one week ago that showed lichen sclerosus, is \"very irritated\" and steroid cream was not helping     PT denies fever, chills, nausea and vomiting       Objective   No acute distress  Excellent communications  Well-nourished   Pelvic Exam: tenderness appears at biopsy sites, appears as normal granulation healing tissue, cultures obtained, care instructions given                       Results reviewed today:    No results found for this visit on 04/06/22. Assessment and Plan          Diagnosis Orders   1. Vulvar irritation  Culture, Genital    sulfamethoxazole-trimethoprim (BACTRIM DS;SEPTRA DS) 800-160 MG per tablet    fluconazole (DIFLUCAN) 150 MG tablet    lidocaine (XYLOCAINE) 5 % ointment       patient to stop all other topicals, ok to use topical lidocaine, will cover with antibiotic while awaiting cultures although appears as normal healing. prophy diflucan per patient request      I am having Tony Torres start on sulfamethoxazole-trimethoprim, fluconazole, and lidocaine. I am also having her maintain her propranolol, furosemide, loratadine, montelukast, fluticasone, albuterol sulfate HFA, dicyclomine, NONFORMULARY, sertraline, nitrofurantoin, fluticasone-salmeterol, oxybutynin, nortriptyline, Estradiol, clobetasol, nitrofurantoin, and sertraline. No follow-ups on file. There are no Patient Instructions on file for this visit.     Time spent 20 minutes      VEE Riggins CNM,4/6/2022 3:48 PM

## 2022-04-09 LAB
CULTURE: NORMAL
SPECIMEN DESCRIPTION: NORMAL

## 2022-04-12 ENCOUNTER — OFFICE VISIT (OUTPATIENT)
Dept: OBGYN | Age: 57
End: 2022-04-12
Payer: COMMERCIAL

## 2022-04-12 ENCOUNTER — TELEPHONE (OUTPATIENT)
Dept: OBGYN | Age: 57
End: 2022-04-12

## 2022-04-12 VITALS
BODY MASS INDEX: 32.39 KG/M2 | HEIGHT: 60 IN | SYSTOLIC BLOOD PRESSURE: 134 MMHG | WEIGHT: 165 LBS | DIASTOLIC BLOOD PRESSURE: 80 MMHG

## 2022-04-12 DIAGNOSIS — F41.9 ANXIETY: Primary | ICD-10-CM

## 2022-04-12 DIAGNOSIS — N90.89 VULVAR LESION: ICD-10-CM

## 2022-04-12 PROCEDURE — 99213 OFFICE O/P EST LOW 20 MIN: CPT | Performed by: ADVANCED PRACTICE MIDWIFE

## 2022-04-12 RX ORDER — ALPRAZOLAM 0.5 MG/1
0.5 TABLET ORAL 3 TIMES DAILY PRN
Qty: 20 TABLET | Refills: 0 | Status: SHIPPED | OUTPATIENT
Start: 2022-04-12 | End: 2022-05-12

## 2022-04-12 RX ORDER — SULFAMETHOXAZOLE AND TRIMETHOPRIM 800; 160 MG/1; MG/1
1 TABLET ORAL 2 TIMES DAILY
Qty: 14 TABLET | Refills: 0 | Status: SHIPPED | OUTPATIENT
Start: 2022-04-12 | End: 2022-04-19

## 2022-04-12 NOTE — TELEPHONE ENCOUNTER
Can you let patient know she should probably not overlap her pamelor and xanax at night, so only use the xanax sparingly during the day for anxiety

## 2022-04-12 NOTE — PROGRESS NOTES
PROBLEM VISIT     Date of service: 2022    Roney Newton  Is a 55 y.o.  female    PT's PCP is: Efraín Bentley MD     : 1965                                             Subjective:       No LMP recorded. Patient has had a hysterectomy. OB History    Para Term  AB Living   2 2 2     2   SAB IAB Ectopic Molar Multiple Live Births             2      # Outcome Date GA Lbr Leo/2nd Weight Sex Delivery Anes PTL Lv   2 Term 89 40w0d   M Vag-Spont   SAVANNA   1 Term 86 40w0d   F Vag-Spont   SAVANNA        Social History     Tobacco Use   Smoking Status Never Smoker   Smokeless Tobacco Never Used        Social History     Substance and Sexual Activity   Alcohol Use No       Allergies: Latex      Current Outpatient Medications:     ALPRAZolam (XANAX) 0.5 MG tablet, Take 1 tablet by mouth 3 times daily as needed for Sleep or Anxiety for up to 30 days. , Disp: 20 tablet, Rfl: 0    sulfamethoxazole-trimethoprim (BACTRIM DS;SEPTRA DS) 800-160 MG per tablet, Take 1 tablet by mouth 2 times daily for 7 days, Disp: 14 tablet, Rfl: 0    sulfamethoxazole-trimethoprim (BACTRIM DS;SEPTRA DS) 800-160 MG per tablet, Take 1 tablet by mouth 2 times daily for 7 days, Disp: 14 tablet, Rfl: 0    lidocaine (XYLOCAINE) 5 % ointment, Apply topically as needed.  Every 2 hours, Disp: 30 g, Rfl: 1    fluticasone-salmeterol (ADVAIR) 250-50 MCG/DOSE AEPB, Inhale 1 puff into the lungs 2 times daily, Disp: , Rfl:     oxybutynin (DITROPAN-XL) 5 MG extended release tablet, Take 5 mg by mouth daily, Disp: , Rfl:     nortriptyline (PAMELOR) 50 MG capsule, Take 50 mg by mouth nightly, Disp: , Rfl:     Estradiol (VAGIFEM) 10 MCG TABS vaginal tablet, Place 1 tablet vaginally Twice a Week, Disp: 8 tablet, Rfl: 11    clobetasol (TEMOVATE) 0.05 % ointment, Apply topically 2 times daily as needed, Disp: 45 g, Rfl: 2    nitrofurantoin (MACRODANTIN) 50 MG capsule, Take 1 capsule by mouth daily, Disp: 90 capsule, Rfl: 3    sertraline (ZOLOFT) 50 MG tablet, Take 1 tablet by mouth daily, Disp: 90 tablet, Rfl: 3    nitrofurantoin (MACRODANTIN) 50 MG capsule, Take 1 capsule by mouth once daily, Disp: 90 capsule, Rfl: 1    albuterol sulfate  (90 Base) MCG/ACT inhaler, inhale 2 puffs by mouth and INTO THE LUNGS every 4 hours if needed, Disp: , Rfl:     dicyclomine (BENTYL) 10 MG capsule, , Disp: , Rfl:     NONFORMULARY, Indications: Control , Disp: , Rfl:     sertraline (ZOLOFT) 50 MG tablet, Take 1 tablet by mouth once daily, Disp: 90 tablet, Rfl: 3    fluticasone (FLONASE) 50 MCG/ACT nasal spray, , Disp: , Rfl:     propranolol (INDERAL) 20 MG tablet, Take 20 mg by mouth 3 times daily, Disp: , Rfl:     furosemide (LASIX) 20 MG tablet, Take 20 mg by mouth 2 times daily, Disp: , Rfl:     loratadine (CLARITIN) 10 MG tablet, Take 10 mg by mouth daily, Disp: , Rfl:     montelukast (SINGULAIR) 10 MG tablet, Take 10 mg by mouth nightly, Disp: , Rfl:     Social History     Substance and Sexual Activity   Sexual Activity Not Currently    Partners: Male       Last Yearly:  02/13/2020    Last pap: 02/2020    Last HPV: unknown    Have you had a positive covid test: No    Have you had the covid immunization: Yes    Chief Complaint   Patient presents with    Vaginitis     Recheck vaginal irritation. PE:  Vital Signs  Blood pressure 134/80, height 5' (1.524 m), weight 165 lb (74.8 kg), not currently breastfeeding. Estimated body mass index is 32.22 kg/m² as calculated from the following:    Height as of this encounter: 5' (1.524 m). Weight as of this encounter: 165 lb (74.8 kg). No data recorded    NURSE: Lianna MCALLISTER    HPI:patient here to recheck biopsy sites, appeared as normal granulation tissue and culture was negative;  patient feels bactroban helped and the bactrim more than anything else. Vulvar irritation is causing her anxiety though.      PT denies fever, chills, nausea and vomiting Objective   No acute distress  Excellent communications  Well-nourished      Pelvic Exam: GENITAL/URINARY:  External Genitalia:  General appearance; normal, Hair distribution; normal, Lesions absent, sites now almost completely healed                                                         Results reviewed today:    No results found for this visit on 04/12/22. Assessment and Plan          Diagnosis Orders   1. Anxiety  ALPRAZolam (XANAX) 0.5 MG tablet   2. Vulvar lesion  sulfamethoxazole-trimethoprim (BACTRIM DS;SEPTRA DS) 800-160 MG per tablet       will continue 2nd week of antibiotics and after a month return to steroids for tx of lichen sclerosus      I am having Bethany Hutchinson start on ALPRAZolam and sulfamethoxazole-trimethoprim. I am also having her maintain her propranolol, furosemide, loratadine, montelukast, fluticasone, albuterol sulfate HFA, dicyclomine, NONFORMULARY, sertraline, nitrofurantoin, fluticasone-salmeterol, oxybutynin, nortriptyline, Estradiol, clobetasol, nitrofurantoin, sertraline, sulfamethoxazole-trimethoprim, and lidocaine. Return in about 2 months (around 6/12/2022) for vulvar recheck. There are no Patient Instructions on file for this visit.     Time spent 20 minutes      Debbie Phillip 44 9:46 PM

## 2022-04-13 ENCOUNTER — TELEPHONE (OUTPATIENT)
Dept: OBGYN | Age: 57
End: 2022-04-13

## 2022-04-26 DIAGNOSIS — N39.0 RECURRENT UTI: ICD-10-CM

## 2022-04-26 RX ORDER — NITROFURANTOIN MACROCRYSTALS 50 MG/1
50 CAPSULE ORAL DAILY
Qty: 90 CAPSULE | Refills: 3 | Status: SHIPPED | OUTPATIENT
Start: 2022-04-26

## 2022-06-14 ENCOUNTER — OFFICE VISIT (OUTPATIENT)
Dept: OBGYN | Age: 57
End: 2022-06-14
Payer: COMMERCIAL

## 2022-06-14 VITALS — WEIGHT: 164 LBS | DIASTOLIC BLOOD PRESSURE: 74 MMHG | SYSTOLIC BLOOD PRESSURE: 122 MMHG | BODY MASS INDEX: 32.03 KG/M2

## 2022-06-14 DIAGNOSIS — L90.0 LICHEN SCLEROSUS: Primary | ICD-10-CM

## 2022-06-14 PROCEDURE — 99213 OFFICE O/P EST LOW 20 MIN: CPT | Performed by: ADVANCED PRACTICE MIDWIFE

## 2022-06-14 NOTE — PROGRESS NOTES
PROBLEM VISIT     Date of service: 2022    Mare Garza  Is a 55 y.o.  female    PT's PCP is: Michelle Barlow MD     : 1965                                             Subjective:       No LMP recorded. Patient has had a hysterectomy. OB History    Para Term  AB Living   2 2 2     2   SAB IAB Ectopic Molar Multiple Live Births             2      # Outcome Date GA Lbr Leo/2nd Weight Sex Delivery Anes PTL Lv   2 Term 89 40w0d   M Vag-Spont   SAVANNA   1 Term 86 40w0d   F Vag-Spont   SAVANNA        Social History     Tobacco Use   Smoking Status Never Smoker   Smokeless Tobacco Never Used        Social History     Substance and Sexual Activity   Alcohol Use No       Allergies: Latex      Current Outpatient Medications:     lidocaine (XYLOCAINE) 5 % ointment, Apply topically as needed.  Every 2 hours, Disp: 30 g, Rfl: 1    fluticasone-salmeterol (ADVAIR) 250-50 MCG/DOSE AEPB, Inhale 1 puff into the lungs 2 times daily, Disp: , Rfl:     oxybutynin (DITROPAN-XL) 5 MG extended release tablet, Take 5 mg by mouth daily, Disp: , Rfl:     nortriptyline (PAMELOR) 50 MG capsule, Take 50 mg by mouth nightly, Disp: , Rfl:     clobetasol (TEMOVATE) 0.05 % ointment, Apply topically 2 times daily as needed, Disp: 45 g, Rfl: 2    nitrofurantoin (MACRODANTIN) 50 MG capsule, Take 1 capsule by mouth once daily, Disp: 90 capsule, Rfl: 1    albuterol sulfate  (90 Base) MCG/ACT inhaler, inhale 2 puffs by mouth and INTO THE LUNGS every 4 hours if needed, Disp: , Rfl:     dicyclomine (BENTYL) 10 MG capsule, , Disp: , Rfl:     sertraline (ZOLOFT) 50 MG tablet, Take 1 tablet by mouth once daily, Disp: 90 tablet, Rfl: 3    fluticasone (FLONASE) 50 MCG/ACT nasal spray, , Disp: , Rfl:     propranolol (INDERAL) 20 MG tablet, Take 20 mg by mouth 3 times daily, Disp: , Rfl:     furosemide (LASIX) 20 MG tablet, Take 20 mg by mouth 2 times daily, Disp: , Rfl:     loratadine (CLARITIN) 10 MG tablet, Take 10 mg by mouth daily, Disp: , Rfl:     montelukast (SINGULAIR) 10 MG tablet, Take 10 mg by mouth nightly, Disp: , Rfl:     nitrofurantoin (MACRODANTIN) 50 MG capsule, Take 1 capsule by mouth daily (Patient not taking: Reported on 6/14/2022), Disp: 90 capsule, Rfl: 3    Estradiol (VAGIFEM) 10 MCG TABS vaginal tablet, Place 1 tablet vaginally Twice a Week (Patient not taking: Reported on 6/14/2022), Disp: 8 tablet, Rfl: 11    sertraline (ZOLOFT) 50 MG tablet, Take 1 tablet by mouth daily (Patient not taking: Reported on 6/14/2022), Disp: 90 tablet, Rfl: 3    NONFORMULARY, Indications: Control  (Patient not taking: No sig reported), Disp: , Rfl:     Social History     Substance and Sexual Activity   Sexual Activity Yes    Partners: Male       Last Yearly:  2/2020    Last pap:2/2020    Last HPV: unknown    Have you had a positive covid test: No    Have you had the covid immunization: Yes    Chief Complaint   Patient presents with    Lesion(s)     Using the cream on and off depending on if she is irritated or not. No other concerns. PE:  Vital Signs  Blood pressure 122/74, weight 164 lb (74.4 kg), not currently breastfeeding. Estimated body mass index is 32.03 kg/m² as calculated from the following:    Height as of 4/12/22: 5' (1.524 m). Weight as of this encounter: 164 lb (74.4 kg). No data recorded    NURSE: Kait Speaker LPN    HPI: here for f/u to biopsy for vulvar irritation showing lichen sclerosus, doing well with topicals     PT denies fever, chills, nausea and vomiting       Objective   No acute distress  Excellent communications  Well-nourished   Pelvic Exam: GENITAL/URINARY:  External Genitalia:  General appearance; normal, Hair distribution; normal, Lesions absent, biopsy site healed                                                          Results reviewed today:    No results found for this visit on 06/14/22. Assessment and Plan          Diagnosis Orders   1.  Lichen sclerosus         continue with prn use of topical steroids      I am having Romero Hein maintain her propranolol, furosemide, loratadine, montelukast, fluticasone, albuterol sulfate HFA, dicyclomine, NONFORMULARY, sertraline, nitrofurantoin, fluticasone-salmeterol, oxybutynin, nortriptyline, Estradiol, clobetasol, sertraline, lidocaine, and nitrofurantoin. Return if symptoms worsen or fail to improve. There are no Patient Instructions on file for this visit.     Time spent 20 minutes      Claudine Cantor Dr 11:05 PM

## 2023-03-14 ENCOUNTER — OFFICE VISIT (OUTPATIENT)
Dept: OBGYN | Age: 58
End: 2023-03-14
Payer: COMMERCIAL

## 2023-03-14 VITALS
DIASTOLIC BLOOD PRESSURE: 84 MMHG | HEIGHT: 60 IN | SYSTOLIC BLOOD PRESSURE: 136 MMHG | WEIGHT: 167 LBS | BODY MASS INDEX: 32.79 KG/M2

## 2023-03-14 DIAGNOSIS — N39.0 RECURRENT UTI: ICD-10-CM

## 2023-03-14 DIAGNOSIS — F43.23 ADJUSTMENT DISORDER WITH MIXED ANXIETY AND DEPRESSED MOOD: ICD-10-CM

## 2023-03-14 DIAGNOSIS — N76.0 VULVOVAGINITIS: ICD-10-CM

## 2023-03-14 DIAGNOSIS — Z01.419 ENCOUNTER FOR ANNUAL ROUTINE GYNECOLOGICAL EXAMINATION: Primary | ICD-10-CM

## 2023-03-14 DIAGNOSIS — Z12.31 SCREENING MAMMOGRAM, ENCOUNTER FOR: ICD-10-CM

## 2023-03-14 PROCEDURE — 99396 PREV VISIT EST AGE 40-64: CPT | Performed by: ADVANCED PRACTICE MIDWIFE

## 2023-03-14 RX ORDER — NITROFURANTOIN MACROCRYSTALS 50 MG/1
50 CAPSULE ORAL DAILY
Qty: 90 CAPSULE | Refills: 3 | Status: SHIPPED | OUTPATIENT
Start: 2023-03-14

## 2023-03-14 RX ORDER — CLOBETASOL PROPIONATE 0.5 MG/G
OINTMENT TOPICAL
Qty: 45 G | Refills: 2 | Status: SHIPPED | OUTPATIENT
Start: 2023-03-14

## 2023-03-14 NOTE — PROGRESS NOTES
YEARLY PHYSICAL    Date of service: 3/14/2023    Monica Rojo  Is a 65 y.o.  , female    PT's PCP is: Burlene Sicard, MD     : 1965                                             Subjective:       No LMP recorded. Patient has had a hysterectomy. Are your menses regular: not applicable    OB History    Para Term  AB Living   2 2 2     2   SAB IAB Ectopic Molar Multiple Live Births             2      # Outcome Date GA Lbr Leo/2nd Weight Sex Delivery Anes PTL Lv   2 Term 89 40w0d   M Vag-Spont   SAVANNA   1 Term 86 40w0d   F Vag-Spont   SAVANNA        Social History     Tobacco Use   Smoking Status Never   Smokeless Tobacco Never        Social History     Substance and Sexual Activity   Alcohol Use No       Family History   Problem Relation Age of Onset    Other Mother     Heart Failure Mother     Cancer Father         melanoma, lung    Heart Failure Maternal Grandmother     Heart Failure Paternal Grandfather     Other Other         No family h/o ovarian or breast cancer. No family h/o DVT.         Any family history of breast or ovarian cancer: No    Any family history of blood clots: No    Have you had a positive covid test: No    Have you had the covid immunization: Yes      Allergies: Latex      Current Outpatient Medications:     clobetasol (TEMOVATE) 0.05 % ointment, Apply topically 2 times daily as needed, Disp: 45 g, Rfl: 2    nitrofurantoin (MACRODANTIN) 50 MG capsule, Take 1 capsule by mouth daily, Disp: 90 capsule, Rfl: 3    sertraline (ZOLOFT) 50 MG tablet, Take 1 tablet by mouth daily, Disp: 90 tablet, Rfl: 3    fluticasone-salmeterol (ADVAIR) 250-50 MCG/DOSE AEPB, Inhale 1 puff into the lungs 2 times daily, Disp: , Rfl:     nortriptyline (PAMELOR) 50 MG capsule, Take 50 mg by mouth nightly, Disp: , Rfl:     albuterol sulfate  (90 Base) MCG/ACT inhaler, inhale 2 puffs by mouth and INTO THE LUNGS every 4 hours if needed, Disp: , Rfl:     dicyclomine (BENTYL) 10 MG capsule, , Disp: , Rfl:     sertraline (ZOLOFT) 50 MG tablet, Take 1 tablet by mouth once daily, Disp: 90 tablet, Rfl: 3    fluticasone (FLONASE) 50 MCG/ACT nasal spray, , Disp: , Rfl:     furosemide (LASIX) 20 MG tablet, Take 20 mg by mouth 2 times daily, Disp: , Rfl:     loratadine (CLARITIN) 10 MG tablet, Take 10 mg by mouth daily, Disp: , Rfl:     montelukast (SINGULAIR) 10 MG tablet, Take 10 mg by mouth nightly, Disp: , Rfl:     Social History     Substance and Sexual Activity   Sexual Activity Yes    Partners: Male       Any bleeding or pain with intercourse: No    Last Yearly date:  06/15/2021    Last pap date and results: 02/13/2020 negative    Last HPV date and results: unknown    Last Mammogram date and results: 2021    Last Dexa scan date and results: never    Last colorectal screen- type:colonoscopy*  date  2022 results negative    Do you do self breast exams: Yes    Past Medical History:   Diagnosis Date    Hypertension     Lichen sclerosus        Past Surgical History:   Procedure Laterality Date    APPENDECTOMY      CHOLECYSTECTOMY      HYSTERECTOMY, VAGINAL      KNEE SURGERY      x 4    OVARY REMOVAL      SHOULDER SURGERY Left        Family History   Problem Relation Age of Onset    Other Mother     Heart Failure Mother     Cancer Father         melanoma, lung    Heart Failure Maternal Grandmother     Heart Failure Paternal Grandfather     Other Other         No family h/o ovarian or breast cancer. No family h/o DVT. Chief Complaint   Patient presents with    Gynecologic Exam     Yearly exam. Last pap 02/13/2020 negative. Refill Macrobid and Zoloft. PE:  Vital Signs  Blood pressure 136/84, height 5' (1.524 m), weight 167 lb (75.8 kg), not currently breastfeeding. Estimated body mass index is 32.61 kg/m² as calculated from the following:    Height as of this encounter: 5' (1.524 m).     Weight as of this encounter: 167 lb (75.8 kg). Labs:    No results found for this visit on 03/14/23. No data recorded    NURSE: Lianna MCALLISTER    HPI: here for annual exam    Review of Systems   Constitutional: Negative. HENT:  Negative for congestion. Respiratory:  Negative for shortness of breath. Cardiovascular:  Negative for chest pain. Genitourinary:  Negative for dysuria. Recurrent uti   Musculoskeletal:  Negative for back pain. Skin:  Negative for rash. Neurological:  Negative for headaches. Psychiatric/Behavioral:          Doing well currently with anxiety       Objective  Lymphatic:   no lymphadenopathy  Heent:   negative   Cor: regular rate and rhythm, no murmurs              Pul:clear to auscultation bilaterally- no wheezes, rales or rhonchi, normal air movement, no respiratory distress      GI: Abdomen soft, non-tender. BS normal. No masses,  No organomegaly           Extremities: normal strength, tone, and muscle mass   Breasts: Breast:normal appearance, no masses or tenderness   Pelvic Exam: GENITAL/URINARY:  External Genitalia:  General appearance; normal, Hair distribution; normal, Lesions absent  Urethral Meatus:  Size normal, Location normal, Lesions absent, Prolapse absent  Urethra: Fullness absent, Masses absent  Bladder:  Fullness absent, Masses absent, Tenderness absent, Cystocele absent  Vagina:  General appearance normal, Estrogen effect normal, Discharge absent, Lesions absent, Pelvic support normal  [place]a: Masses absent, Tenderness absent  Anus/Perineum:  Lesions absent and Masses absent                                                         Assessment and Plan          Diagnosis Orders   1. Encounter for annual routine gynecological examination        2. Vulvovaginitis  clobetasol (TEMOVATE) 0.05 % ointment      3. Recurrent UTI  nitrofurantoin (MACRODANTIN) 50 MG capsule      4. Adjustment disorder with mixed anxiety and depressed mood  sertraline (ZOLOFT) 50 MG tablet      5. Screening mammogram, encounter for  [facility] DIGITAL SCREEN BILATERAL          desires continuation of prn topical steroid and ssri, \"doing well\"      I have discontinued Bethany Hutchinson's propranolol, NONFORMULARY, oxybutynin, Estradiol, lidocaine, and nitrofurantoin. I have also changed her nitrofurantoin. Additionally, I am having her maintain her furosemide, loratadine, montelukast, fluticasone, albuterol sulfate HFA, dicyclomine, sertraline, fluticasone-salmeterol, nortriptyline, clobetasol, and sertraline. Return in about 1 year (around 3/14/2024) for yearly. She was also counseled on her preventative health maintenance recommendations and follow-up. There are no Patient Instructions on file for this visit.     VEE Alonzo CNM,3/18/2023 1:00 PM

## 2023-03-18 ASSESSMENT — ENCOUNTER SYMPTOMS
BACK PAIN: 0
SHORTNESS OF BREATH: 0

## 2023-04-25 NOTE — TELEPHONE ENCOUNTER
Patient called requesting refill of Macrodantin, I called Suzanne Estrada  and verified that prescription was given 03/14/2023 and will be refilled.

## 2023-06-20 ENCOUNTER — TELEPHONE (OUTPATIENT)
Dept: OBGYN | Age: 58
End: 2023-06-20

## 2023-06-20 NOTE — TELEPHONE ENCOUNTER
----- Message from Vonn Goltz, Faiza Walters sent at 3/14/2023  8:37 AM EDT -----  Make sure mammo ordered

## 2023-07-05 ENCOUNTER — TELEPHONE (OUTPATIENT)
Dept: OBGYN | Age: 58
End: 2023-07-05

## 2023-07-05 NOTE — TELEPHONE ENCOUNTER
Patient called stating that she has been a flare up of vaginal irritation where it seems like she has vaginal dryness and irritation. She has been using the creams Shama Damon gave her for approximately 3 weeks with little to no relief.  Patient scheduled for tomorrow at 8am.

## 2023-07-05 NOTE — TELEPHONE ENCOUNTER
Patient called and left message  with concerns, asking about appointment ? I called and left patient a message to call me to further discuss.

## 2023-07-06 ENCOUNTER — OFFICE VISIT (OUTPATIENT)
Dept: OBGYN | Age: 58
End: 2023-07-06

## 2023-07-06 ENCOUNTER — HOSPITAL ENCOUNTER (OUTPATIENT)
Age: 58
Setting detail: SPECIMEN
Discharge: HOME | End: 2023-07-06
Payer: COMMERCIAL

## 2023-07-06 VITALS
HEIGHT: 60 IN | WEIGHT: 166 LBS | BODY MASS INDEX: 32.59 KG/M2 | DIASTOLIC BLOOD PRESSURE: 78 MMHG | SYSTOLIC BLOOD PRESSURE: 126 MMHG

## 2023-07-06 DIAGNOSIS — R10.2 VAGINAL PAIN: Primary | ICD-10-CM

## 2023-07-06 DIAGNOSIS — R30.0 DYSURIA: ICD-10-CM

## 2023-07-06 DIAGNOSIS — R10.2 VAGINAL PAIN: ICD-10-CM

## 2023-07-06 DIAGNOSIS — N90.5 VULVAR ATROPHY: ICD-10-CM

## 2023-07-06 LAB
BILIRUBIN, POC: NORMAL
BLOOD URINE, POC: NORMAL
CLARITY, POC: CLEAR
COLOR, POC: YELLOW
GLUCOSE URINE, POC: NORMAL
KETONES, POC: NORMAL
LEUKOCYTE EST, POC: NORMAL
NITRITE, POC: NORMAL
PH, POC: 6
PROTEIN, POC: NORMAL
SPECIFIC GRAVITY, POC: 1.02
UROBILINOGEN, POC: NORMAL

## 2023-07-06 PROCEDURE — 87086 URINE CULTURE/COLONY COUNT: CPT

## 2023-07-06 PROCEDURE — 87070 CULTURE OTHR SPECIMN AEROBIC: CPT

## 2023-07-06 RX ORDER — FLUTICASONE PROPIONATE 100 UG/1
POWDER, METERED RESPIRATORY (INHALATION)
COMMUNITY
Start: 2023-04-07

## 2023-07-06 RX ORDER — ESTRADIOL 0.1 MG/G
CREAM VAGINAL
Qty: 42.5 G | Refills: 1 | Status: SHIPPED | OUTPATIENT
Start: 2023-07-06

## 2023-07-06 NOTE — PROGRESS NOTES
PROBLEM VISIT     Date of service: 2023    Cielo Low  Is a 65 y.o. , female    PT's PCP is: Victorino Schulte MD     : 1965                                             Subjective:       No LMP recorded. Patient has had a hysterectomy. OB History    Para Term  AB Living   2 2 2     2   SAB IAB Ectopic Molar Multiple Live Births             2      # Outcome Date GA Lbr Leo/2nd Weight Sex Delivery Anes PTL Lv   2 Term 89 40w0d   M Vag-Spont   SAVANNA   1 Term 86 40w0d   F Vag-Spont   SAVANNA        Social History     Tobacco Use   Smoking Status Never   Smokeless Tobacco Never        Social History     Substance and Sexual Activity   Alcohol Use No       Allergies: Latex      Current Outpatient Medications:     FLOVENT DISKUS 100 MCG/ACT AEPB, , Disp: , Rfl:     estradiol (ESTRACE VAGINAL) 0.1 MG/GM vaginal cream, 1 gram vaginally QD x2 weeks then twice a week, Disp: 42.5 g, Rfl: 1    lidocaine (XYLOCAINE) 5 % ointment, Apply topically as needed. , Disp: 1 each, Rfl: 0    clobetasol (TEMOVATE) 0.05 % ointment, Apply topically 2 times daily as needed, Disp: 45 g, Rfl: 2    nitrofurantoin (MACRODANTIN) 50 MG capsule, Take 1 capsule by mouth daily, Disp: 90 capsule, Rfl: 3    fluticasone-salmeterol (ADVAIR) 250-50 MCG/DOSE AEPB, Inhale 1 puff into the lungs 2 times daily, Disp: , Rfl:     nortriptyline (PAMELOR) 50 MG capsule, Take 1 capsule by mouth nightly, Disp: , Rfl:     albuterol sulfate  (90 Base) MCG/ACT inhaler, inhale 2 puffs by mouth and INTO THE LUNGS every 4 hours if needed, Disp: , Rfl:     dicyclomine (BENTYL) 10 MG capsule, , Disp: , Rfl:     sertraline (ZOLOFT) 50 MG tablet, Take 1 tablet by mouth once daily, Disp: 90 tablet, Rfl: 3    fluticasone (FLONASE) 50 MCG/ACT nasal spray, , Disp: , Rfl:     furosemide (LASIX) 20 MG tablet, Take 1 tablet by mouth 2 times daily, Disp: , Rfl:     loratadine (CLARITIN) 10 MG tablet, Take 1 tablet by mouth daily,

## 2023-07-07 LAB
MICROORGANISM SPEC CULT: NORMAL
SPECIMEN DESCRIPTION: NORMAL

## 2023-07-08 LAB
MICROORGANISM SPEC CULT: NORMAL
SPECIMEN DESCRIPTION: NORMAL

## 2023-07-11 ENCOUNTER — HOSPITAL ENCOUNTER (OUTPATIENT)
Dept: WOMENS IMAGING | Age: 58
Discharge: HOME | End: 2023-07-13
Attending: ADVANCED PRACTICE MIDWIFE
Payer: COMMERCIAL

## 2023-07-11 DIAGNOSIS — Z12.31 SCREENING MAMMOGRAM, ENCOUNTER FOR: ICD-10-CM

## 2023-07-11 PROCEDURE — 77063 BREAST TOMOSYNTHESIS BI: CPT

## 2023-07-20 ENCOUNTER — OFFICE VISIT (OUTPATIENT)
Dept: OBGYN | Age: 58
End: 2023-07-20
Payer: COMMERCIAL

## 2023-07-20 VITALS
BODY MASS INDEX: 32.79 KG/M2 | WEIGHT: 167 LBS | DIASTOLIC BLOOD PRESSURE: 74 MMHG | HEIGHT: 60 IN | SYSTOLIC BLOOD PRESSURE: 136 MMHG

## 2023-07-20 DIAGNOSIS — N90.89 VULVAR IRRITATION: Primary | ICD-10-CM

## 2023-07-20 PROCEDURE — 99213 OFFICE O/P EST LOW 20 MIN: CPT | Performed by: ADVANCED PRACTICE MIDWIFE

## 2023-07-31 ENCOUNTER — TELEPHONE (OUTPATIENT)
Dept: OBGYN | Age: 58
End: 2023-07-31

## 2023-07-31 NOTE — TELEPHONE ENCOUNTER
Patient calls with concerns very , irritated vulva area, feels raw. Pain when she urinates but feels it is because of skin irritation. Using Clobetasol bid ice packs and feels nothing is helping. Advice?

## 2023-08-01 ENCOUNTER — OFFICE VISIT (OUTPATIENT)
Dept: OBGYN | Age: 58
End: 2023-08-01
Payer: COMMERCIAL

## 2023-08-01 ENCOUNTER — HOSPITAL ENCOUNTER (OUTPATIENT)
Age: 58
Setting detail: SPECIMEN
Discharge: HOME | End: 2023-08-01
Payer: COMMERCIAL

## 2023-08-01 VITALS
HEIGHT: 60 IN | DIASTOLIC BLOOD PRESSURE: 98 MMHG | BODY MASS INDEX: 32.79 KG/M2 | SYSTOLIC BLOOD PRESSURE: 140 MMHG | WEIGHT: 167 LBS

## 2023-08-01 DIAGNOSIS — L90.0 LICHEN SCLEROSUS: ICD-10-CM

## 2023-08-01 DIAGNOSIS — N90.89 VULVAR IRRITATION: Primary | ICD-10-CM

## 2023-08-01 DIAGNOSIS — N90.89 VULVAR IRRITATION: ICD-10-CM

## 2023-08-01 LAB
CANDIDA SPECIES: NEGATIVE
GARDNERELLA VAGINALIS: NEGATIVE
SOURCE: NORMAL
TRICHOMONAS: NEGATIVE

## 2023-08-01 PROCEDURE — 87510 GARDNER VAG DNA DIR PROBE: CPT

## 2023-08-01 PROCEDURE — 99213 OFFICE O/P EST LOW 20 MIN: CPT | Performed by: OBSTETRICS & GYNECOLOGY

## 2023-08-01 PROCEDURE — 87660 TRICHOMONAS VAGIN DIR PROBE: CPT

## 2023-08-01 PROCEDURE — 87480 CANDIDA DNA DIR PROBE: CPT

## 2023-08-01 RX ORDER — OXYBUTYNIN CHLORIDE 5 MG/1
5 TABLET, EXTENDED RELEASE ORAL EVERY MORNING
COMMUNITY
Start: 2023-07-20

## 2023-08-01 RX ORDER — BETAMETHASONE DIPROPIONATE 0.05 %
OINTMENT (GRAM) TOPICAL
Qty: 45 G | Refills: 0 | Status: SHIPPED | OUTPATIENT
Start: 2023-08-01

## 2023-08-29 ENCOUNTER — OFFICE VISIT (OUTPATIENT)
Dept: OBGYN | Age: 58
End: 2023-08-29
Payer: COMMERCIAL

## 2023-08-29 VITALS
HEIGHT: 60 IN | DIASTOLIC BLOOD PRESSURE: 80 MMHG | SYSTOLIC BLOOD PRESSURE: 126 MMHG | WEIGHT: 165 LBS | BODY MASS INDEX: 32.39 KG/M2

## 2023-08-29 DIAGNOSIS — N90.5 VULVAR ATROPHY: ICD-10-CM

## 2023-08-29 DIAGNOSIS — L90.0 LICHEN SCLEROSUS: Primary | ICD-10-CM

## 2023-08-29 PROCEDURE — 99213 OFFICE O/P EST LOW 20 MIN: CPT | Performed by: OBSTETRICS & GYNECOLOGY

## 2023-08-29 RX ORDER — ESTRADIOL 10 UG/1
10 INSERT VAGINAL
Qty: 8 TABLET | Refills: 12 | Status: SHIPPED | OUTPATIENT
Start: 2023-08-31

## 2023-08-29 NOTE — PROGRESS NOTES
face and non face to face time spent in determining the total time component. Counseling and education regarding her diagnosis listed below and her options regarding those diagnoses were also included in determining her time component. Diagnosis Orders   1. Lichen sclerosus        2. Vulvar atrophy             The patient had her preventative health maintenance recommendations and follow-up reviewed with her at the completion of her visit. ASSESSMENT:      1. Lichen sclerosus    2. Vulvar atrophy        PLAN:  No orders of the defined types were placed in this encounter.     Return if symptoms worsen or fail to improve, for annual.       Electronically signed by Kelley Concepcion DO on 08/29/23

## 2023-09-05 ENCOUNTER — TELEPHONE (OUTPATIENT)
Dept: OBGYN | Age: 58
End: 2023-09-05

## 2023-09-05 NOTE — TELEPHONE ENCOUNTER
Patient called stating that the Vagifem is giving her diarrhea and would like to know if there are any other options for her?

## 2023-09-11 RX ORDER — ESTRADIOL 0.1 MG/G
1 CREAM VAGINAL
Qty: 42 G | Refills: 2 | Status: SHIPPED | OUTPATIENT
Start: 2023-09-11

## 2023-10-04 ENCOUNTER — TELEPHONE (OUTPATIENT)
Dept: OBGYN | Age: 58
End: 2023-10-04

## 2023-10-17 ENCOUNTER — HOSPITAL ENCOUNTER (OUTPATIENT)
Age: 58
Setting detail: SPECIMEN
Discharge: HOME | End: 2023-10-17
Payer: COMMERCIAL

## 2023-10-17 ENCOUNTER — OFFICE VISIT (OUTPATIENT)
Dept: OBGYN | Age: 58
End: 2023-10-17
Payer: COMMERCIAL

## 2023-10-17 VITALS
DIASTOLIC BLOOD PRESSURE: 88 MMHG | SYSTOLIC BLOOD PRESSURE: 136 MMHG | WEIGHT: 165 LBS | HEIGHT: 60 IN | BODY MASS INDEX: 32.39 KG/M2

## 2023-10-17 DIAGNOSIS — N90.89 VULVAR IRRITATION: ICD-10-CM

## 2023-10-17 DIAGNOSIS — N90.5 VULVAR ATROPHY: ICD-10-CM

## 2023-10-17 DIAGNOSIS — L90.0 LICHEN SCLEROSUS: Primary | ICD-10-CM

## 2023-10-17 DIAGNOSIS — R10.2 VAGINAL PAIN: ICD-10-CM

## 2023-10-17 PROCEDURE — 87660 TRICHOMONAS VAGIN DIR PROBE: CPT

## 2023-10-17 PROCEDURE — 87480 CANDIDA DNA DIR PROBE: CPT

## 2023-10-17 PROCEDURE — 99213 OFFICE O/P EST LOW 20 MIN: CPT | Performed by: OBSTETRICS & GYNECOLOGY

## 2023-10-17 PROCEDURE — 87510 GARDNER VAG DNA DIR PROBE: CPT

## 2023-10-17 RX ORDER — CLOBETASOL PROPIONATE 0.5 MG/G
OINTMENT TOPICAL
Qty: 30 G | Refills: 0 | Status: SHIPPED | OUTPATIENT
Start: 2023-10-17

## 2023-10-18 LAB
CANDIDA SPECIES: POSITIVE
GARDNERELLA VAGINALIS: POSITIVE
SOURCE: ABNORMAL
TRICHOMONAS: NEGATIVE

## 2023-10-18 RX ORDER — FLUCONAZOLE 150 MG/1
150 TABLET ORAL
Qty: 2 TABLET | Refills: 0 | Status: SHIPPED | OUTPATIENT
Start: 2023-10-18 | End: 2023-10-24

## 2023-10-18 RX ORDER — METRONIDAZOLE 7.5 MG/G
1 GEL VAGINAL
Qty: 70 G | Refills: 0 | Status: SHIPPED | OUTPATIENT
Start: 2023-10-18 | End: 2023-10-23

## 2023-10-18 NOTE — TELEPHONE ENCOUNTER
Pharmacy called stating they can only get the 50mg tablets of fluconazole in. They haven't been able to get the 150mg tablets in. OK to do three 50mg tablets instead of one 150mg tablet? Order pended for 50mg tablet. Please edit amount and length of treatment.

## 2023-10-23 RX ORDER — FLUCONAZOLE 50 MG/1
50 TABLET ORAL DAILY
Qty: 7 TABLET | Refills: 0 | Status: SHIPPED | OUTPATIENT
Start: 2023-10-23 | End: 2023-10-30

## 2023-10-31 ENCOUNTER — HOSPITAL ENCOUNTER (OUTPATIENT)
Age: 58
Setting detail: SPECIMEN
Discharge: HOME | End: 2023-10-31
Payer: COMMERCIAL

## 2023-10-31 ENCOUNTER — OFFICE VISIT (OUTPATIENT)
Dept: OBGYN | Age: 58
End: 2023-10-31
Payer: COMMERCIAL

## 2023-10-31 VITALS
BODY MASS INDEX: 32.39 KG/M2 | DIASTOLIC BLOOD PRESSURE: 76 MMHG | HEIGHT: 60 IN | WEIGHT: 165 LBS | SYSTOLIC BLOOD PRESSURE: 124 MMHG

## 2023-10-31 DIAGNOSIS — N90.89 VULVAR IRRITATION: ICD-10-CM

## 2023-10-31 DIAGNOSIS — N90.89 VULVAR IRRITATION: Primary | ICD-10-CM

## 2023-10-31 PROCEDURE — 87660 TRICHOMONAS VAGIN DIR PROBE: CPT

## 2023-10-31 PROCEDURE — 87480 CANDIDA DNA DIR PROBE: CPT

## 2023-10-31 PROCEDURE — 99213 OFFICE O/P EST LOW 20 MIN: CPT | Performed by: OBSTETRICS & GYNECOLOGY

## 2023-10-31 PROCEDURE — 87510 GARDNER VAG DNA DIR PROBE: CPT

## 2023-10-31 RX ORDER — CEPHALEXIN 500 MG/1
500 CAPSULE ORAL 3 TIMES DAILY
COMMUNITY
Start: 2023-10-13

## 2023-10-31 RX ORDER — DEXTROMETHORPHAN HYDROBROMIDE AND PROMETHAZINE HYDROCHLORIDE 15; 6.25 MG/5ML; MG/5ML
SYRUP ORAL
COMMUNITY
Start: 2023-10-13

## 2023-10-31 RX ORDER — DOXYCYCLINE HYCLATE 100 MG/1
CAPSULE ORAL
COMMUNITY
Start: 2023-10-17

## 2023-11-01 LAB
CANDIDA SPECIES: NEGATIVE
GARDNERELLA VAGINALIS: NEGATIVE
SOURCE: NORMAL
TRICHOMONAS: NEGATIVE

## 2023-12-12 RX ORDER — CLOBETASOL PROPIONATE 0.5 MG/G
OINTMENT TOPICAL
Qty: 30 G | Refills: 0 | OUTPATIENT
Start: 2023-12-12

## 2023-12-12 NOTE — TELEPHONE ENCOUNTER
I had referred her to Dr. Marjorie Chou for care and therefore would like all meds to come from her as well so between all the different creams, etc it doesn't get confusing

## 2023-12-14 RX ORDER — CLOBETASOL PROPIONATE 0.5 MG/G
OINTMENT TOPICAL
Qty: 30 G | Refills: 1 | Status: SHIPPED | OUTPATIENT
Start: 2023-12-14

## 2024-03-19 ENCOUNTER — HOSPITAL ENCOUNTER (OUTPATIENT)
Age: 59
Setting detail: SPECIMEN
Discharge: HOME | End: 2024-03-19
Payer: COMMERCIAL

## 2024-03-19 ENCOUNTER — OFFICE VISIT (OUTPATIENT)
Dept: OBGYN | Age: 59
End: 2024-03-19
Payer: COMMERCIAL

## 2024-03-19 VITALS
SYSTOLIC BLOOD PRESSURE: 132 MMHG | WEIGHT: 165 LBS | BODY MASS INDEX: 32.39 KG/M2 | DIASTOLIC BLOOD PRESSURE: 78 MMHG | HEIGHT: 60 IN

## 2024-03-19 DIAGNOSIS — Z12.72 SCREENING FOR VAGINAL CANCER: ICD-10-CM

## 2024-03-19 DIAGNOSIS — Z01.419 ENCOUNTER FOR ANNUAL ROUTINE GYNECOLOGICAL EXAMINATION: Primary | ICD-10-CM

## 2024-03-19 DIAGNOSIS — N39.0 RECURRENT UTI: ICD-10-CM

## 2024-03-19 DIAGNOSIS — F43.23 ADJUSTMENT DISORDER WITH MIXED ANXIETY AND DEPRESSED MOOD: ICD-10-CM

## 2024-03-19 DIAGNOSIS — Z12.31 SCREENING MAMMOGRAM, ENCOUNTER FOR: ICD-10-CM

## 2024-03-19 PROCEDURE — 99396 PREV VISIT EST AGE 40-64: CPT | Performed by: ADVANCED PRACTICE MIDWIFE

## 2024-03-19 PROCEDURE — G0145 SCR C/V CYTO,THINLAYER,RESCR: HCPCS

## 2024-03-19 RX ORDER — NITROFURANTOIN MACROCRYSTALS 50 MG/1
50 CAPSULE ORAL DAILY
Qty: 90 CAPSULE | Refills: 3 | Status: SHIPPED | OUTPATIENT
Start: 2024-03-19

## 2024-03-19 RX ORDER — DILTIAZEM HYDROCHLORIDE 180 MG/1
180 CAPSULE, COATED, EXTENDED RELEASE ORAL DAILY
COMMUNITY
Start: 2024-01-26

## 2024-03-19 RX ORDER — EZETIMIBE 10 MG/1
10 TABLET ORAL DAILY
COMMUNITY

## 2024-03-19 SDOH — ECONOMIC STABILITY: FOOD INSECURITY: WITHIN THE PAST 12 MONTHS, YOU WORRIED THAT YOUR FOOD WOULD RUN OUT BEFORE YOU GOT MONEY TO BUY MORE.: NEVER TRUE

## 2024-03-19 SDOH — ECONOMIC STABILITY: INCOME INSECURITY: HOW HARD IS IT FOR YOU TO PAY FOR THE VERY BASICS LIKE FOOD, HOUSING, MEDICAL CARE, AND HEATING?: NOT HARD AT ALL

## 2024-03-19 SDOH — ECONOMIC STABILITY: FOOD INSECURITY: WITHIN THE PAST 12 MONTHS, THE FOOD YOU BOUGHT JUST DIDN'T LAST AND YOU DIDN'T HAVE MONEY TO GET MORE.: NEVER TRUE

## 2024-03-19 SDOH — ECONOMIC STABILITY: HOUSING INSECURITY
IN THE LAST 12 MONTHS, WAS THERE A TIME WHEN YOU DID NOT HAVE A STEADY PLACE TO SLEEP OR SLEPT IN A SHELTER (INCLUDING NOW)?: NO

## 2024-03-19 ASSESSMENT — PATIENT HEALTH QUESTIONNAIRE - PHQ9
SUM OF ALL RESPONSES TO PHQ QUESTIONS 1-9: 0
2. FEELING DOWN, DEPRESSED OR HOPELESS: NOT AT ALL
SUM OF ALL RESPONSES TO PHQ QUESTIONS 1-9: 0
1. LITTLE INTEREST OR PLEASURE IN DOING THINGS: NOT AT ALL
SUM OF ALL RESPONSES TO PHQ9 QUESTIONS 1 & 2: 0

## 2024-03-19 ASSESSMENT — ENCOUNTER SYMPTOMS
ABDOMINAL PAIN: 0
SHORTNESS OF BREATH: 0
BACK PAIN: 0

## 2024-03-19 NOTE — PROGRESS NOTES
YEARLY PHYSICAL    Date of service: 3/19/2024    Bethany Hutchinson  Is a 58 y.o.  , female    PT's PCP is: Mikey Guerra MD     : 1965                                             Subjective:       No LMP recorded. Patient has had a hysterectomy.     Are your menses regular: not applicable    OB History    Para Term  AB Living   2 2 2     2   SAB IAB Ectopic Molar Multiple Live Births             2      # Outcome Date GA Lbr Leo/2nd Weight Sex Delivery Anes PTL Lv   2 Term 89 40w0d   M Vag-Spont   SAVANNA   1 Term 87 40w0d   F Vag-Spont   SAVANNA        Social History     Tobacco Use   Smoking Status Never   Smokeless Tobacco Never        Social History     Substance and Sexual Activity   Alcohol Use No       Family History   Problem Relation Age of Onset    Other Mother     Heart Failure Mother     Cancer Father         melanoma, lung    Heart Failure Maternal Grandmother     Heart Failure Paternal Grandfather     Other Other         No family h/o ovarian or breast cancer. No family h/o DVT.        Any family history of breast or ovarian cancer: No    Any family history of blood clots: No    Allergies: Latex      Current Outpatient Medications:     dilTIAZem (CARDIZEM CD) 180 MG extended release capsule, Take 1 capsule by mouth daily, Disp: , Rfl:     ezetimibe (ZETIA) 10 MG tablet, Take 1 tablet by mouth daily, Disp: , Rfl:     sertraline (ZOLOFT) 50 MG tablet, Take 1 tablet by mouth daily, Disp: 90 tablet, Rfl: 3    nitrofurantoin (MACRODANTIN) 50 MG capsule, Take 1 capsule by mouth daily, Disp: 90 capsule, Rfl: 3    clobetasol (TEMOVATE) 0.05 % ointment, Apply topically 2 times daily for 7 days, Disp: 30 g, Rfl: 1    oxybutynin (DITROPAN-XL) 5 MG extended release tablet, Take 1 tablet by mouth every morning, Disp: , Rfl:     FLOVENT DISKUS 100 MCG/ACT AEPB, , Disp: , Rfl:     fluticasone-salmeterol (ADVAIR) 250-50

## 2024-04-03 LAB — CYTOLOGY REPORT: NORMAL

## 2024-08-27 ENCOUNTER — HOSPITAL ENCOUNTER (OUTPATIENT)
Dept: WOMENS IMAGING | Age: 59
Discharge: HOME | End: 2024-08-29
Attending: ADVANCED PRACTICE MIDWIFE
Payer: COMMERCIAL

## 2024-08-27 DIAGNOSIS — Z12.31 SCREENING MAMMOGRAM, ENCOUNTER FOR: ICD-10-CM

## 2024-08-27 PROCEDURE — 77063 BREAST TOMOSYNTHESIS BI: CPT

## 2025-01-27 RX ORDER — CLOBETASOL PROPIONATE 0.5 MG/G
OINTMENT TOPICAL
Qty: 30 G | Refills: 1 | Status: SHIPPED | OUTPATIENT
Start: 2025-01-27

## 2025-02-03 RX ORDER — BETAMETHASONE DIPROPIONATE 0.05 %
OINTMENT (GRAM) TOPICAL
Qty: 1 EACH | Refills: 0 | Status: SHIPPED | OUTPATIENT
Start: 2025-02-03

## 2025-03-20 DIAGNOSIS — N39.0 RECURRENT UTI: ICD-10-CM

## 2025-03-24 RX ORDER — NITROFURANTOIN MACROCRYSTALS 50 MG/1
50 CAPSULE ORAL DAILY
Qty: 90 CAPSULE | Refills: 0 | Status: SHIPPED | OUTPATIENT
Start: 2025-03-24

## 2025-03-25 ENCOUNTER — OFFICE VISIT (OUTPATIENT)
Dept: OBGYN | Age: 60
End: 2025-03-25
Payer: COMMERCIAL

## 2025-03-25 VITALS
HEIGHT: 60 IN | WEIGHT: 161.8 LBS | BODY MASS INDEX: 31.77 KG/M2 | DIASTOLIC BLOOD PRESSURE: 84 MMHG | SYSTOLIC BLOOD PRESSURE: 134 MMHG

## 2025-03-25 DIAGNOSIS — Z01.419 ENCOUNTER FOR ANNUAL ROUTINE GYNECOLOGICAL EXAMINATION: Primary | ICD-10-CM

## 2025-03-25 DIAGNOSIS — N95.1 MENOPAUSAL SYMPTOMS: ICD-10-CM

## 2025-03-25 DIAGNOSIS — Z12.31 SCREENING MAMMOGRAM, ENCOUNTER FOR: ICD-10-CM

## 2025-03-25 PROCEDURE — 99396 PREV VISIT EST AGE 40-64: CPT | Performed by: ADVANCED PRACTICE MIDWIFE

## 2025-03-25 RX ORDER — ESTRADIOL 1 MG/1
1 TABLET ORAL DAILY
Qty: 30 TABLET | Refills: 3 | Status: SHIPPED | OUTPATIENT
Start: 2025-03-25

## 2025-03-25 SDOH — ECONOMIC STABILITY: FOOD INSECURITY: WITHIN THE PAST 12 MONTHS, YOU WORRIED THAT YOUR FOOD WOULD RUN OUT BEFORE YOU GOT MONEY TO BUY MORE.: NEVER TRUE

## 2025-03-25 SDOH — ECONOMIC STABILITY: FOOD INSECURITY: WITHIN THE PAST 12 MONTHS, THE FOOD YOU BOUGHT JUST DIDN'T LAST AND YOU DIDN'T HAVE MONEY TO GET MORE.: NEVER TRUE

## 2025-03-25 ASSESSMENT — PATIENT HEALTH QUESTIONNAIRE - PHQ9
2. FEELING DOWN, DEPRESSED OR HOPELESS: SEVERAL DAYS
SUM OF ALL RESPONSES TO PHQ QUESTIONS 1-9: 2
1. LITTLE INTEREST OR PLEASURE IN DOING THINGS: SEVERAL DAYS
SUM OF ALL RESPONSES TO PHQ QUESTIONS 1-9: 2

## 2025-03-25 NOTE — PROGRESS NOTES
YEARLY PHYSICAL    Date of service: 3/25/2025    Bethany Hutchinson  Is a 59 y.o.  , female    PT's PCP is: Mikey Guerra MD     : 1965                                             Subjective:       No LMP recorded. Patient has had a hysterectomy.     Are your menses regular: not applicable    OB History    Para Term  AB Living   2 2 2   2   SAB IAB Ectopic Molar Multiple Live Births        2      # Outcome Date GA Lbr Leo/2nd Weight Sex Type Anes PTL Lv   2 Term 89 40w0d   M Vag-Spont   SAVANNA   1 Term 87 40w0d   F Vag-Spont   SAVANNA        Social History     Tobacco Use   Smoking Status Never   Smokeless Tobacco Never        Social History     Substance and Sexual Activity   Alcohol Use No       Family History   Problem Relation Age of Onset    Other Mother     Heart Failure Mother     Cancer Father         melanoma, lung    Heart Failure Maternal Grandmother     Heart Failure Paternal Grandfather     Other Other         No family h/o ovarian or breast cancer. No family h/o DVT.        Any family history of breast or ovarian cancer: No    Any family history of blood clots: No    Allergies: Latex      Current Outpatient Medications:     estradiol (ESTRACE) 1 MG tablet, Take 1 tablet by mouth daily, Disp: 30 tablet, Rfl: 3    nitrofurantoin (MACRODANTIN) 50 MG capsule, Take 1 capsule by mouth once daily, Disp: 90 capsule, Rfl: 0    betamethasone dipropionate 0.05 % ointment, Apply topically daily as needed., Disp: 1 each, Rfl: 0    dilTIAZem (CARDIZEM CD) 180 MG extended release capsule, Take 1 capsule by mouth daily, Disp: , Rfl:     ezetimibe (ZETIA) 10 MG tablet, Take 1 tablet by mouth daily, Disp: , Rfl:     sertraline (ZOLOFT) 50 MG tablet, Take 1 tablet by mouth daily, Disp: 90 tablet, Rfl: 3    FLOVENT DISKUS 100 MCG/ACT AEPB, , Disp: , Rfl:     fluticasone-salmeterol (ADVAIR) 250-50 MCG/DOSE AEPB, Inhale 1

## 2025-03-26 ASSESSMENT — ENCOUNTER SYMPTOMS
SHORTNESS OF BREATH: 0
BACK PAIN: 0
ABDOMINAL PAIN: 0

## 2025-04-09 RX ORDER — CLOBETASOL PROPIONATE 0.5 MG/G
OINTMENT TOPICAL
Qty: 1 EACH | Refills: 1 | Status: SHIPPED | OUTPATIENT
Start: 2025-04-09

## 2025-04-15 ENCOUNTER — HOSPITAL ENCOUNTER (OUTPATIENT)
Age: 60
Setting detail: SPECIMEN
Discharge: HOME | End: 2025-04-15
Payer: COMMERCIAL

## 2025-04-15 ENCOUNTER — OFFICE VISIT (OUTPATIENT)
Dept: OBGYN | Age: 60
End: 2025-04-15
Payer: COMMERCIAL

## 2025-04-15 VITALS
HEIGHT: 60 IN | WEIGHT: 162.8 LBS | SYSTOLIC BLOOD PRESSURE: 126 MMHG | DIASTOLIC BLOOD PRESSURE: 74 MMHG | BODY MASS INDEX: 31.96 KG/M2

## 2025-04-15 DIAGNOSIS — B37.31 VAGINAL CANDIDIASIS: ICD-10-CM

## 2025-04-15 DIAGNOSIS — N76.2 ACUTE VULVITIS: Primary | ICD-10-CM

## 2025-04-15 DIAGNOSIS — L90.0 LICHEN SCLEROSUS: ICD-10-CM

## 2025-04-15 DIAGNOSIS — F43.23 ADJUSTMENT DISORDER WITH MIXED ANXIETY AND DEPRESSED MOOD: ICD-10-CM

## 2025-04-15 PROCEDURE — 87510 GARDNER VAG DNA DIR PROBE: CPT

## 2025-04-15 PROCEDURE — 87480 CANDIDA DNA DIR PROBE: CPT

## 2025-04-15 PROCEDURE — 87070 CULTURE OTHR SPECIMN AEROBIC: CPT

## 2025-04-15 PROCEDURE — 99213 OFFICE O/P EST LOW 20 MIN: CPT | Performed by: ADVANCED PRACTICE MIDWIFE

## 2025-04-15 PROCEDURE — 87660 TRICHOMONAS VAGIN DIR PROBE: CPT

## 2025-04-15 RX ORDER — FLUCONAZOLE 150 MG/1
150 TABLET ORAL DAILY
Qty: 3 TABLET | Refills: 0 | Status: SHIPPED | OUTPATIENT
Start: 2025-04-15

## 2025-04-15 NOTE — PROGRESS NOTES
PROBLEM VISIT     Date of service: 4/15/2025    Bethany Hutchinson  Is a 59 y.o. , female    PT's PCP is: Mikey Guerra MD     : 1965                                             Subjective:       No LMP recorded. Patient has had a hysterectomy.   OB History    Para Term  AB Living   2 2 2   2   SAB IAB Ectopic Molar Multiple Live Births        2      # Outcome Date GA Lbr Leo/2nd Weight Sex Type Anes PTL Lv   2 Term 89 40w0d   M Vag-Spont   SAVANNA   1 Term 87 40w0d   F Vag-Spont   SAVANNA        Social History     Tobacco Use   Smoking Status Never   Smokeless Tobacco Never        Social History     Substance and Sexual Activity   Alcohol Use No       Allergies: Latex      Current Outpatient Medications:     sertraline (ZOLOFT) 50 MG tablet, Take 1 tablet by mouth daily, Disp: 30 tablet, Rfl: 11    fluconazole (DIFLUCAN) 150 MG tablet, Take 1 tablet by mouth daily, Disp: 3 tablet, Rfl: 0    clobetasol (TEMOVATE) 0.05 % ointment, Apply topically 2 times daily., Disp: 1 each, Rfl: 1    estradiol (ESTRACE) 1 MG tablet, Take 1 tablet by mouth daily, Disp: 30 tablet, Rfl: 3    nitrofurantoin (MACRODANTIN) 50 MG capsule, Take 1 capsule by mouth once daily, Disp: 90 capsule, Rfl: 0    betamethasone dipropionate 0.05 % ointment, Apply topically daily as needed., Disp: 1 each, Rfl: 0    dilTIAZem (CARDIZEM CD) 180 MG extended release capsule, Take 1 capsule by mouth daily, Disp: , Rfl:     ezetimibe (ZETIA) 10 MG tablet, Take 1 tablet by mouth daily, Disp: , Rfl:     sertraline (ZOLOFT) 50 MG tablet, Take 1 tablet by mouth daily, Disp: 90 tablet, Rfl: 3    oxybutynin (DITROPAN-XL) 5 MG extended release tablet, Take 1 tablet by mouth every morning, Disp: , Rfl:     FLOVENT DISKUS 100 MCG/ACT AEPB, , Disp: , Rfl:     fluticasone-salmeterol (ADVAIR) 250-50 MCG/DOSE AEPB, Inhale 1 puff into the lungs 2 times daily, Disp: , Rfl:     nortriptyline (PAMELOR) 50 MG capsule, Take 1 capsule by

## 2025-04-16 ENCOUNTER — RESULTS FOLLOW-UP (OUTPATIENT)
Dept: OBGYN | Age: 60
End: 2025-04-16

## 2025-04-16 LAB
CANDIDA SPECIES: POSITIVE
GARDNERELLA VAGINALIS: POSITIVE
SOURCE: ABNORMAL
TRICHOMONAS: NEGATIVE

## 2025-04-16 RX ORDER — FLUCONAZOLE 150 MG/1
150 TABLET ORAL ONCE
Qty: 1 TABLET | Refills: 0 | Status: SHIPPED | OUTPATIENT
Start: 2025-04-16 | End: 2025-04-16

## 2025-04-16 RX ORDER — METRONIDAZOLE 7.5 MG/G
1 GEL VAGINAL DAILY
Qty: 1 EACH | Refills: 0 | Status: SHIPPED | OUTPATIENT
Start: 2025-04-16 | End: 2025-04-21

## 2025-04-18 LAB
MICROORGANISM SPEC CULT: ABNORMAL
MICROORGANISM SPEC CULT: ABNORMAL
SERVICE CMNT-IMP: ABNORMAL
SPECIMEN DESCRIPTION: ABNORMAL

## 2025-05-06 ENCOUNTER — TELEPHONE (OUTPATIENT)
Dept: OBGYN | Age: 60
End: 2025-05-06

## 2025-05-06 RX ORDER — METRONIDAZOLE 500 MG/1
500 TABLET ORAL 2 TIMES DAILY
Qty: 14 TABLET | Refills: 0 | Status: SHIPPED | OUTPATIENT
Start: 2025-05-06 | End: 2025-05-13

## 2025-05-06 NOTE — TELEPHONE ENCOUNTER
Patient calls with concerns of vaginal inflammation and redness. Feels she may have BV. Asking if you can give prescription of if she needs appointment.

## 2025-05-20 ENCOUNTER — OFFICE VISIT (OUTPATIENT)
Dept: OBGYN | Age: 60
End: 2025-05-20
Payer: COMMERCIAL

## 2025-05-20 ENCOUNTER — HOSPITAL ENCOUNTER (OUTPATIENT)
Age: 60
Setting detail: SPECIMEN
Discharge: HOME | End: 2025-05-20
Payer: COMMERCIAL

## 2025-05-20 VITALS
SYSTOLIC BLOOD PRESSURE: 132 MMHG | WEIGHT: 163 LBS | BODY MASS INDEX: 32 KG/M2 | DIASTOLIC BLOOD PRESSURE: 82 MMHG | HEIGHT: 60 IN

## 2025-05-20 DIAGNOSIS — R30.0 DYSURIA: ICD-10-CM

## 2025-05-20 DIAGNOSIS — N39.46 MIXED STRESS AND URGE URINARY INCONTINENCE: ICD-10-CM

## 2025-05-20 DIAGNOSIS — N95.1 MENOPAUSAL SYMPTOMS: Primary | ICD-10-CM

## 2025-05-20 LAB
BILIRUBIN, POC: NEGATIVE
BLOOD URINE, POC: NEGATIVE
CLARITY, POC: CLEAR
COLOR, POC: YELLOW
GLUCOSE URINE, POC: NEGATIVE MG/DL
KETONES, POC: NEGATIVE MG/DL
LEUKOCYTE EST, POC: NEGATIVE
NITRITE, POC: NEGATIVE
PH, POC: 6.5
PROTEIN, POC: NEGATIVE MG/DL
SPECIFIC GRAVITY, POC: 1.02
UROBILINOGEN, POC: 0.2 MG/DL

## 2025-05-20 PROCEDURE — 81002 URINALYSIS NONAUTO W/O SCOPE: CPT | Performed by: ADVANCED PRACTICE MIDWIFE

## 2025-05-20 PROCEDURE — 87102 FUNGUS ISOLATION CULTURE: CPT

## 2025-05-20 PROCEDURE — 99213 OFFICE O/P EST LOW 20 MIN: CPT | Performed by: ADVANCED PRACTICE MIDWIFE

## 2025-05-20 PROCEDURE — 87086 URINE CULTURE/COLONY COUNT: CPT

## 2025-05-20 PROCEDURE — 87206 SMEAR FLUORESCENT/ACID STAI: CPT

## 2025-05-20 RX ORDER — ESTRADIOL 1 MG/1
1 TABLET ORAL DAILY
Qty: 30 TABLET | Refills: 10 | Status: SHIPPED | OUTPATIENT
Start: 2025-05-20

## 2025-05-20 RX ORDER — ESTRADIOL 0.1 MG/G
1 CREAM VAGINAL
Qty: 42.5 G | Refills: 2 | Status: SHIPPED | OUTPATIENT
Start: 2025-05-22

## 2025-05-20 NOTE — PROGRESS NOTES
normal, Hair distribution; normal, Lesions absent, No LS exacerbation or signs, large amount of curdish adherent white discharge around clitoral zepeda and anterior vault/ periurethral  Urethral Meatus:  Size normal, Location normal, Lesions absent, Prolapse absent  Urethra:  Fullness absent, Masses absent  Bladder:  Fullness absent, Masses absent, Tenderness absent, Cystocele absent  Vagina:  General appearance normal, Discharge absent, Lesions absent, minimal atrophic changes  Cervix:  surgically absent  Uterus:  surgically absent  Adenexa:  Masses absent, Tenderness absent  Anus/Perineum:  Lesions absent and Masses absent                                                       Results reviewed today:    No results found for this visit on 05/20/25.    Assessment and Plan         Assessment & Plan  Dysuria   New, not at goal (unstable), changes made today:      Orders:    POCT Urinalysis Dipstick no Micro    Culture, Fungus; Future    Menopausal symptoms   Chronic, at goal (stable), continue current treatment plan    Orders:    estradiol (ESTRACE) 1 MG tablet; Take 1 tablet by mouth daily    estradiol (ESTRACE VAGINAL) 0.1 MG/GM vaginal cream; Place 1 g vaginally Twice a Week    Mixed stress and urge urinary incontinence   New, not at goal (unstable), continue current plan pending work up below    Orders:    Culture, Urine; Future    Will add vaginal estrogen to regimen, discussed use.        I am having Bethany Hutchinson start on estradiol. I am also having her maintain her furosemide, loratadine, montelukast, fluticasone, albuterol sulfate HFA, dicyclomine, fluticasone-salmeterol, nortriptyline, Flovent Diskus, oxyBUTYnin, dilTIAZem, ezetimibe, sertraline, betamethasone dipropionate, nitrofurantoin, clobetasol, sertraline, fluconazole, and estradiol.    Return for March next yearly.    There are no Patient Instructions on file for this visit.    Time spent 20 minutes      VEE Helton CNM,5/20/2025 10:18

## 2025-05-21 ENCOUNTER — RESULTS FOLLOW-UP (OUTPATIENT)
Dept: OBGYN | Age: 60
End: 2025-05-21

## 2025-05-21 LAB
MICROORGANISM SPEC CULT: ABNORMAL
SERVICE CMNT-IMP: ABNORMAL
SPECIMEN DESCRIPTION: ABNORMAL

## 2025-05-21 RX ORDER — FLUCONAZOLE 100 MG/1
100 TABLET ORAL 2 TIMES DAILY
Qty: 14 TABLET | Refills: 0 | Status: SHIPPED | OUTPATIENT
Start: 2025-05-21 | End: 2025-05-28

## 2025-05-24 LAB
MICROORGANISM SPEC CULT: ABNORMAL
MICROORGANISM SPEC CULT: ABNORMAL
MICROORGANISM/AGENT SPEC: ABNORMAL
SPECIMEN DESCRIPTION: ABNORMAL

## 2025-06-02 ENCOUNTER — TELEPHONE (OUTPATIENT)
Dept: OBGYN | Age: 60
End: 2025-06-02

## 2025-06-02 NOTE — TELEPHONE ENCOUNTER
Patient calls with concerns, c/o vaginal irritation , red and sore. Patient using Estrace vaginal cream twice a week and symptoms  are worse when using the cream. Please advise.

## 2025-07-10 ENCOUNTER — HOSPITAL ENCOUNTER (OUTPATIENT)
Age: 60
Setting detail: SPECIMEN
Discharge: HOME | End: 2025-07-10
Payer: COMMERCIAL

## 2025-07-10 ENCOUNTER — OFFICE VISIT (OUTPATIENT)
Dept: OBGYN | Age: 60
End: 2025-07-10
Payer: COMMERCIAL

## 2025-07-10 VITALS
SYSTOLIC BLOOD PRESSURE: 136 MMHG | DIASTOLIC BLOOD PRESSURE: 86 MMHG | HEIGHT: 60 IN | WEIGHT: 163 LBS | BODY MASS INDEX: 32 KG/M2

## 2025-07-10 DIAGNOSIS — N89.8 VAGINAL IRRITATION: Primary | ICD-10-CM

## 2025-07-10 DIAGNOSIS — N89.8 VAGINAL IRRITATION: ICD-10-CM

## 2025-07-10 PROCEDURE — 87660 TRICHOMONAS VAGIN DIR PROBE: CPT

## 2025-07-10 PROCEDURE — 87070 CULTURE OTHR SPECIMN AEROBIC: CPT

## 2025-07-10 PROCEDURE — 99213 OFFICE O/P EST LOW 20 MIN: CPT | Performed by: ADVANCED PRACTICE MIDWIFE

## 2025-07-10 PROCEDURE — 87480 CANDIDA DNA DIR PROBE: CPT

## 2025-07-10 PROCEDURE — 87510 GARDNER VAG DNA DIR PROBE: CPT

## 2025-07-10 RX ORDER — SERTRALINE HYDROCHLORIDE 100 MG/1
100 TABLET, FILM COATED ORAL DAILY
COMMUNITY
Start: 2025-06-14

## 2025-07-10 RX ORDER — DILTIAZEM HYDROCHLORIDE 240 MG/1
240 CAPSULE, EXTENDED RELEASE ORAL DAILY
COMMUNITY
Start: 2025-06-23

## 2025-07-10 NOTE — PROGRESS NOTES
Assessment and Plan         Assessment & Plan  Vaginal irritation   Chronic, at goal (stable), continue current treatment plan    Orders:    Culture, Genital (with Gram Stain); Future    Vaginitis DNA Probe; Future            Return for we will call with results.    I have discontinued Bethany RHODESFrantz Evangelina's dilTIAZem. I am also having her maintain her furosemide, loratadine, montelukast, fluticasone, albuterol sulfate HFA, dicyclomine, fluticasone-salmeterol, nortriptyline, Flovent Diskus, oxyBUTYnin, ezetimibe, betamethasone dipropionate, nitrofurantoin, clobetasol, sertraline, estradiol, estradiol, sertraline, and Cartia XT.    She was also counseled on her preventative health maintenance recommendations and follow-up.     There are no Patient Instructions on file for this visit.    VEE Aly CNM,7/10/2025 5:55 PM

## 2025-07-12 LAB
CANDIDA SPECIES: NEGATIVE
GARDNERELLA VAGINALIS: NEGATIVE
MICROORGANISM SPEC CULT: NORMAL
SERVICE CMNT-IMP: NORMAL
SOURCE: NORMAL
SPECIMEN DESCRIPTION: NORMAL
TRICHOMONAS: NEGATIVE

## 2025-07-14 LAB
MICROORGANISM SPEC CULT: NORMAL
SERVICE CMNT-IMP: NORMAL
SPECIMEN DESCRIPTION: NORMAL